# Patient Record
Sex: FEMALE | Race: BLACK OR AFRICAN AMERICAN | NOT HISPANIC OR LATINO | URBAN - METROPOLITAN AREA
[De-identification: names, ages, dates, MRNs, and addresses within clinical notes are randomized per-mention and may not be internally consistent; named-entity substitution may affect disease eponyms.]

---

## 2019-08-20 ENCOUNTER — INPATIENT (INPATIENT)
Facility: HOSPITAL | Age: 54
LOS: 6 days | Discharge: HOME | End: 2019-08-27
Attending: INTERNAL MEDICINE | Admitting: INTERNAL MEDICINE
Payer: MEDICAID

## 2019-08-20 VITALS
TEMPERATURE: 99 F | SYSTOLIC BLOOD PRESSURE: 134 MMHG | OXYGEN SATURATION: 100 % | RESPIRATION RATE: 16 BRPM | HEART RATE: 110 BPM | DIASTOLIC BLOOD PRESSURE: 89 MMHG

## 2019-08-20 LAB
ALBUMIN SERPL ELPH-MCNC: 3.8 G/DL — SIGNIFICANT CHANGE UP (ref 3.3–5)
ALP SERPL-CCNC: 72 U/L — SIGNIFICANT CHANGE UP (ref 40–120)
ALT FLD-CCNC: 12 U/L — SIGNIFICANT CHANGE UP (ref 4–33)
ANION GAP SERPL CALC-SCNC: 8 MMO/L — SIGNIFICANT CHANGE UP (ref 7–14)
APPEARANCE UR: CLEAR — SIGNIFICANT CHANGE UP
AST SERPL-CCNC: 28 U/L — SIGNIFICANT CHANGE UP (ref 4–32)
BASE EXCESS BLDV CALC-SCNC: 6.6 MMOL/L — SIGNIFICANT CHANGE UP
BASOPHILS # BLD AUTO: 0.01 K/UL — SIGNIFICANT CHANGE UP (ref 0–0.2)
BASOPHILS NFR BLD AUTO: 0.1 % — SIGNIFICANT CHANGE UP (ref 0–2)
BILIRUB SERPL-MCNC: 0.5 MG/DL — SIGNIFICANT CHANGE UP (ref 0.2–1.2)
BILIRUB UR-MCNC: NEGATIVE — SIGNIFICANT CHANGE UP
BLOOD GAS VENOUS - CREATININE: 0.57 MG/DL — SIGNIFICANT CHANGE UP (ref 0.5–1.3)
BLOOD UR QL VISUAL: NEGATIVE — SIGNIFICANT CHANGE UP
BUN SERPL-MCNC: 7 MG/DL — SIGNIFICANT CHANGE UP (ref 7–23)
CALCIUM SERPL-MCNC: 9.2 MG/DL — SIGNIFICANT CHANGE UP (ref 8.4–10.5)
CHLORIDE BLDV-SCNC: 103 MMOL/L — SIGNIFICANT CHANGE UP (ref 96–108)
CHLORIDE SERPL-SCNC: 101 MMOL/L — SIGNIFICANT CHANGE UP (ref 98–107)
CO2 SERPL-SCNC: 29 MMOL/L — SIGNIFICANT CHANGE UP (ref 22–31)
COLOR SPEC: SIGNIFICANT CHANGE UP
CREAT SERPL-MCNC: 0.56 MG/DL — SIGNIFICANT CHANGE UP (ref 0.5–1.3)
EOSINOPHIL # BLD AUTO: 0.01 K/UL — SIGNIFICANT CHANGE UP (ref 0–0.5)
EOSINOPHIL NFR BLD AUTO: 0.1 % — SIGNIFICANT CHANGE UP (ref 0–6)
GAS PNL BLDV: 138 MMOL/L — SIGNIFICANT CHANGE UP (ref 136–146)
GLUCOSE BLDV-MCNC: 101 MG/DL — HIGH (ref 70–99)
GLUCOSE SERPL-MCNC: 100 MG/DL — HIGH (ref 70–99)
GLUCOSE UR-MCNC: NEGATIVE — SIGNIFICANT CHANGE UP
HCO3 BLDV-SCNC: 30 MMOL/L — HIGH (ref 20–27)
HCT VFR BLD CALC: 30.8 % — LOW (ref 34.5–45)
HCT VFR BLDV CALC: 29.3 % — LOW (ref 34.5–45)
HETEROPH AB TITR SER AGGL: NEGATIVE — SIGNIFICANT CHANGE UP
HGB BLD-MCNC: 8.9 G/DL — LOW (ref 11.5–15.5)
HGB BLDV-MCNC: 9.5 G/DL — LOW (ref 11.5–15.5)
IMM GRANULOCYTES NFR BLD AUTO: 0.4 % — SIGNIFICANT CHANGE UP (ref 0–1.5)
KETONES UR-MCNC: NEGATIVE — SIGNIFICANT CHANGE UP
LACTATE BLDV-MCNC: 1 MMOL/L — SIGNIFICANT CHANGE UP (ref 0.5–2)
LEUKOCYTE ESTERASE UR-ACNC: NEGATIVE — SIGNIFICANT CHANGE UP
LYMPHOCYTES # BLD AUTO: 1.55 K/UL — SIGNIFICANT CHANGE UP (ref 1–3.3)
LYMPHOCYTES # BLD AUTO: 18.9 % — SIGNIFICANT CHANGE UP (ref 13–44)
MCHC RBC-ENTMCNC: 22.7 PG — LOW (ref 27–34)
MCHC RBC-ENTMCNC: 28.9 % — LOW (ref 32–36)
MCV RBC AUTO: 78.6 FL — LOW (ref 80–100)
MONOCYTES # BLD AUTO: 0.41 K/UL — SIGNIFICANT CHANGE UP (ref 0–0.9)
MONOCYTES NFR BLD AUTO: 5 % — SIGNIFICANT CHANGE UP (ref 2–14)
NEUTROPHILS # BLD AUTO: 6.17 K/UL — SIGNIFICANT CHANGE UP (ref 1.8–7.4)
NEUTROPHILS NFR BLD AUTO: 75.5 % — SIGNIFICANT CHANGE UP (ref 43–77)
NITRITE UR-MCNC: NEGATIVE — SIGNIFICANT CHANGE UP
NRBC # FLD: 0 K/UL — SIGNIFICANT CHANGE UP (ref 0–0)
PCO2 BLDV: 48 MMHG — SIGNIFICANT CHANGE UP (ref 41–51)
PH BLDV: 7.42 PH — SIGNIFICANT CHANGE UP (ref 7.32–7.43)
PH UR: 8 — SIGNIFICANT CHANGE UP (ref 5–8)
PLATELET # BLD AUTO: 165 K/UL — SIGNIFICANT CHANGE UP (ref 150–400)
PMV BLD: 11.5 FL — SIGNIFICANT CHANGE UP (ref 7–13)
PO2 BLDV: 77 MMHG — HIGH (ref 35–40)
POTASSIUM BLDV-SCNC: 3.9 MMOL/L — SIGNIFICANT CHANGE UP (ref 3.4–4.5)
POTASSIUM SERPL-MCNC: 4.1 MMOL/L — SIGNIFICANT CHANGE UP (ref 3.5–5.3)
POTASSIUM SERPL-SCNC: 4.1 MMOL/L — SIGNIFICANT CHANGE UP (ref 3.5–5.3)
PROT SERPL-MCNC: 9.1 G/DL — HIGH (ref 6–8.3)
PROT UR-MCNC: 10 — SIGNIFICANT CHANGE UP
RBC # BLD: 3.92 M/UL — SIGNIFICANT CHANGE UP (ref 3.8–5.2)
RBC # FLD: 14.6 % — HIGH (ref 10.3–14.5)
SAO2 % BLDV: 95.7 % — HIGH (ref 60–85)
SODIUM SERPL-SCNC: 138 MMOL/L — SIGNIFICANT CHANGE UP (ref 135–145)
SP GR SPEC: 1.01 — SIGNIFICANT CHANGE UP (ref 1–1.04)
T4 FREE SERPL-MCNC: 1.3 NG/DL — SIGNIFICANT CHANGE UP (ref 0.9–1.8)
TSH SERPL-MCNC: 1.11 UIU/ML — SIGNIFICANT CHANGE UP (ref 0.27–4.2)
UROBILINOGEN FLD QL: NORMAL — SIGNIFICANT CHANGE UP
WBC # BLD: 8.18 K/UL — SIGNIFICANT CHANGE UP (ref 3.8–10.5)
WBC # FLD AUTO: 8.18 K/UL — SIGNIFICANT CHANGE UP (ref 3.8–10.5)

## 2019-08-20 PROCEDURE — 71046 X-RAY EXAM CHEST 2 VIEWS: CPT | Mod: 26

## 2019-08-20 PROCEDURE — 70491 CT SOFT TISSUE NECK W/DYE: CPT | Mod: 26

## 2019-08-20 RX ORDER — SODIUM CHLORIDE 9 MG/ML
1000 INJECTION INTRAMUSCULAR; INTRAVENOUS; SUBCUTANEOUS ONCE
Refills: 0 | Status: COMPLETED | OUTPATIENT
Start: 2019-08-20 | End: 2019-08-20

## 2019-08-20 RX ORDER — KETOROLAC TROMETHAMINE 30 MG/ML
15 SYRINGE (ML) INJECTION ONCE
Refills: 0 | Status: DISCONTINUED | OUTPATIENT
Start: 2019-08-20 | End: 2019-08-20

## 2019-08-20 RX ADMIN — Medication 100 MILLIGRAM(S): at 20:50

## 2019-08-20 RX ADMIN — Medication 15 MILLIGRAM(S): at 19:10

## 2019-08-20 RX ADMIN — SODIUM CHLORIDE 2000 MILLILITER(S): 9 INJECTION INTRAMUSCULAR; INTRAVENOUS; SUBCUTANEOUS at 19:10

## 2019-08-20 NOTE — ED ADULT NURSE REASSESSMENT NOTE - NS ED NURSE REASSESS COMMENT FT1
pt received in RW at approx 20:15 from previous RN. Pt presents to ED c/o sore throat, palpitations and joint pain x 1 month. Pt had recent travel to [place]. Denies fevers/chills, N/V/D, CP, SOB. Pt throat appears slighty swollen, but airway patent, respirations even and unlabored, O2 sat on room air 100%, no drooling noted. Denies diff breathing or inability to swallow. Pt appears in no acute or apparent distress at this time. Pt arrives with IV line by EMS. Will continue to monitor.

## 2019-08-20 NOTE — ED PROVIDER NOTE - OBJECTIVE STATEMENT
54yo F w/ no significant pmhx presents to the ED c/o sore throat, palpitations and joint pains x 1 month. Pt is recently arrived from [place], states she was seen by PCP in [place] and was advised she needs to see an "auto-immune" doctor; however, pt decided to come to [place] for evaluation instead. Pt states she feels weak, today she felt like she may pass out while standing. Denies fevers, chills, cp, sob, abd pain, n/v/d, rashes.

## 2019-08-20 NOTE — ED PROVIDER NOTE - ATTENDING CONTRIBUTION TO CARE
Afebrile. Awake and Alert. Lungs CTA. Heart RRR. Abdomen soft NTND. CN II-XII grossly intact. Moves all extremities without lateralization. Mouth: Oropharynx clear, uvula midline, no tonsilar hypertrophy, exudate or erythema, no anterior cervical lymphadenopathy. No drooling. No hoarseness.    Pt reports 1 week sore throat. No drooling or dysphagia. No cough.  Pt reports also one week generalized weakness worse when standing up a/w fast heart beat. No CP or SOB.    CDU for IV Clindamycin for pharyngitis and MRI parotid gland given CT recs.  f/o medical clinic oupt. Afebrile. Awake and Alert. Lungs CTA. Heart RRR. Abdomen soft NTND. CN II-XII grossly intact. Moves all extremities without lateralization. Mouth: Oropharynx clear, uvula midline, no tonsilar hypertrophy, exudate or erythema, no anterior cervical lymphadenopathy. No drooling. No hoarseness.    Pt reports 1 week sore throat. No drooling or dysphagia. No cough.  Pt reports also one week generalized weakness worse when standing up a/w fast heart beat. No CP or SOB.    CDU for IV Clindamycin for pharyngitis and MRI parotid gland given CT recs.  Mononucleosis screen negative  EKG sinus tachycardia improved with IVF  Thyroid studies sent  f/o medical clinic oupt.

## 2019-08-20 NOTE — ED PROVIDER NOTE - PROGRESS NOTE DETAILS
Asked by MARIBEL to evaluate patient in intake who will be placed in CDU. See attending attestation. AL.

## 2019-08-20 NOTE — ED ADULT TRIAGE NOTE - CHIEF COMPLAINT QUOTE
Pt [facility] just arrived here from [place], c/o palpitations and dizziness c/o joint pain x several months no respiratory distress noted appears comfortable   PMH: denies

## 2019-08-20 NOTE — ED ADULT TRIAGE NOTE - NS ED NOTE AC HIGH RISK COUNTRIES
Other Countries no loss of consciousness, no gait abnormality, no headache, no sensory deficits, and no weakness.

## 2019-08-21 DIAGNOSIS — D64.9 ANEMIA, UNSPECIFIED: ICD-10-CM

## 2019-08-21 DIAGNOSIS — J02.9 ACUTE PHARYNGITIS, UNSPECIFIED: ICD-10-CM

## 2019-08-21 DIAGNOSIS — Z29.9 ENCOUNTER FOR PROPHYLACTIC MEASURES, UNSPECIFIED: ICD-10-CM

## 2019-08-21 DIAGNOSIS — M25.50 PAIN IN UNSPECIFIED JOINT: ICD-10-CM

## 2019-08-21 DIAGNOSIS — K11.20 SIALOADENITIS, UNSPECIFIED: ICD-10-CM

## 2019-08-21 LAB
C3 SERPL-MCNC: 60.1 MG/DL — LOW (ref 90–180)
C4 SERPL-MCNC: 20.2 MG/DL — SIGNIFICANT CHANGE UP (ref 10–40)
HBA1C BLD-MCNC: 4.9 % — SIGNIFICANT CHANGE UP (ref 4–5.6)
SPECIMEN SOURCE: SIGNIFICANT CHANGE UP
SPECIMEN SOURCE: SIGNIFICANT CHANGE UP

## 2019-08-21 PROCEDURE — 99223 1ST HOSP IP/OBS HIGH 75: CPT

## 2019-08-21 PROCEDURE — 70542 MRI ORBIT/FACE/NECK W/DYE: CPT | Mod: 26

## 2019-08-21 RX ORDER — DEXAMETHASONE 0.5 MG/5ML
4 ELIXIR ORAL ONCE
Refills: 0 | Status: COMPLETED | OUTPATIENT
Start: 2019-08-21 | End: 2019-08-21

## 2019-08-21 RX ORDER — DEXAMETHASONE 0.5 MG/5ML
10 ELIXIR ORAL ONCE
Refills: 0 | Status: COMPLETED | OUTPATIENT
Start: 2019-08-21 | End: 2019-08-21

## 2019-08-21 RX ORDER — SODIUM CHLORIDE 9 MG/ML
1000 INJECTION INTRAMUSCULAR; INTRAVENOUS; SUBCUTANEOUS
Refills: 0 | Status: DISCONTINUED | OUTPATIENT
Start: 2019-08-21 | End: 2019-08-22

## 2019-08-21 RX ORDER — SODIUM CHLORIDE 9 MG/ML
1000 INJECTION INTRAMUSCULAR; INTRAVENOUS; SUBCUTANEOUS ONCE
Refills: 0 | Status: COMPLETED | OUTPATIENT
Start: 2019-08-21 | End: 2019-08-21

## 2019-08-21 RX ORDER — KETOROLAC TROMETHAMINE 30 MG/ML
30 SYRINGE (ML) INJECTION EVERY 6 HOURS
Refills: 0 | Status: DISCONTINUED | OUTPATIENT
Start: 2019-08-21 | End: 2019-08-22

## 2019-08-21 RX ADMIN — Medication 102 MILLIGRAM(S): at 03:19

## 2019-08-21 RX ADMIN — Medication 100 MILLIGRAM(S): at 06:50

## 2019-08-21 RX ADMIN — Medication 4 MILLIGRAM(S): at 01:59

## 2019-08-21 RX ADMIN — SODIUM CHLORIDE 125 MILLILITER(S): 9 INJECTION INTRAMUSCULAR; INTRAVENOUS; SUBCUTANEOUS at 06:54

## 2019-08-21 RX ADMIN — Medication 100 MILLIGRAM(S): at 22:53

## 2019-08-21 RX ADMIN — Medication 100 MILLIGRAM(S): at 14:14

## 2019-08-21 RX ADMIN — SODIUM CHLORIDE 1000 MILLILITER(S): 9 INJECTION INTRAMUSCULAR; INTRAVENOUS; SUBCUTANEOUS at 03:20

## 2019-08-21 NOTE — H&P ADULT - NSHPPHYSICALEXAM_GEN_ALL_CORE
PHYSICAL EXAM:  GENERAL: NAD, well-developed  HEAD:  Atraumatic, Normocephalic  EYES: EOMI, PERRLA, conjunctiva and sclera clear  NECK: Supple, No JVD  CHEST/LUNG: Clear to auscultation bilaterally; No wheeze  HEART: NL s1s2, regular rate and rhythm; No murmurs, rubs, or gallops  ABDOMEN: Soft, Nontender, Nondistended; Bowel sounds present  EXTREMITIES:  2+ Peripheral Pulses, No clubbing, cyanosis, or edema  PSYCH: AAOx3  NEUROLOGY: non-focal  SKIN: No rashes or lesions PHYSICAL EXAM:  GENERAL: NAD, well-developed  HEAD:  Atraumatic, Normocephalic  MOUTH: Parotids nontender. Poor dentition.   EYES: EOMI, PERRLA, conjunctiva and sclera clear  NECK: Supple, No JVD  CHEST/LUNG: Clear to auscultation bilaterally; No wheeze  HEART: NL s1s2, regular rate and rhythm; No murmurs, rubs, or gallops  ABDOMEN: Soft, Nontender, Nondistended; Bowel sounds present  EXTREMITIES:  2+ Peripheral Pulses, No clubbing, cyanosis, or edema  PSYCH: AAOx3  NEUROLOGY: non-focal  SKIN: No rashes or lesions PHYSICAL EXAM:  GENERAL: NAD, well-developed  HEAD: Atraumatic, Normocephalic  MOUTH: Parotids nontender. Poor dentition.   EYES: EOMI, PERRL, conjunctiva and sclera clear  NECK: Supple, No JVD, No LAD  CHEST/LUNG: Good inspiratory effort; Clear to auscultation bilaterally; No wheeze  HEART: NL s1s2, regular rate and rhythm; No murmurs, rubs, or gallops; No LE edema b/l  ABDOMEN: Soft, Nontender, Nondistended; Bowel sounds present  EXTREMITIES:  2+ Peripheral Pulses, No cyanosis  NEUROLOGY: AA&Ox3; non-focal  SKIN: Warm and dry; No rashes or lesions

## 2019-08-21 NOTE — H&P ADULT - NSHPREVIEWOFSYSTEMS_GEN_ALL_CORE
REVIEW OF SYSTEMS:    CONSTITUTIONAL: No weakness, fevers or chills  EYES/ENT: No visual changes;  No vertigo or throat pain   NECK: No pain or stiffness  RESPIRATORY: No cough, wheezing, hemoptysis; No shortness of breath  CARDIOVASCULAR: No chest pain or palpitations  GASTROINTESTINAL: No abdominal or epigastric pain. No nausea, vomiting, or hematemesis; No diarrhea or constipation. No melena or hematochezia.  GENITOURINARY: No dysuria, frequency or hematuria  NEUROLOGICAL: No numbness or weakness  SKIN: No itching, burning, rashes, or lesions   All other review of systems is negative unless indicated above. REVIEW OF SYSTEMS:    CONSTITUTIONAL: +fatigue especially with exertion  EYES/ENT: No visual changes;  sore throat  NECK: No pain or stiffness  RESPIRATORY: No cough, wheezing, hemoptysis; No shortness of breath  CARDIOVASCULAR: No chest pain or palpitations  GASTROINTESTINAL: No abdominal or epigastric pain. No nausea, vomiting, or hematemesis; No diarrhea or constipation. No melena or hematochezia.  GENITOURINARY: No dysuria, frequency or hematuria  NEUROLOGICAL: No numbness or weakness  SKIN: No itching, burning, rashes, or lesions   All other review of systems is negative unless indicated above. REVIEW OF SYSTEMS:    CONSTITUTIONAL: +Fatigue especially with exertion. No fever or chills.  EYES: +Dry eyes. No eye pain, blurry vision, or double vision.  MOUTH: +Dry mouth. Poor dentition with cavities.   NECK: No pain or stiffness  RESPIRATORY: No cough, wheezing, hemoptysis; No shortness of breath  CARDIOVASCULAR: No chest pain or palpitations  GASTROINTESTINAL: No abdominal or epigastric pain. No nausea, vomiting, or hematemesis; No diarrhea or constipation. No melena or hematochezia.  GENITOURINARY: +Vaginal dryness. +Dyspareunia. No dysuria, frequency or hematuria.  MUSCULOSKELETAL: +Diffuse arthralgias. No back pain.  NEUROLOGICAL: No headaches, seizures, numbness/tingling, or weakness  SKIN: No itching, burning, rashes, or lesions   All other review of systems is negative unless indicated above.

## 2019-08-21 NOTE — ED CDU PROVIDER INITIAL DAY NOTE - PROGRESS NOTE DETAILS
patient assessed on morning rounds. TOlerating po, endorsing feeling better-still with some generalized fatigue. Ambulating in cdu without difficulty. Exam A & O x 3, NAD, HEENT -no tonsillar swelling/erythema or exudate, uvula midline, +nontender left submandibular 1cm firm mass, no overlying skin changes,  and no facial asymmetry; neck supple, nontender, lungs CTAB, heart with reg rhythm without murmur; abdomen soft NTND; extremities with no edema; neuro exam non focal with no motor or sensory deficits. ct findings noted. SEnt to cdu for mri to f/u ct findings. Plan today for supportive care, mri, reas.

## 2019-08-21 NOTE — ED CDU PROVIDER INITIAL DAY NOTE - ENMT, MLM
Airway patent. Nasal mucosa clear. Mouth with moist mucosa. Pharynx hyperemic, uvula is midline.  Neck supple.

## 2019-08-21 NOTE — H&P ADULT - PROBLEM SELECTOR PLAN 1
-Rheum consult  -SSA/SSB abs  -nora  -RF  -IGs  -ESR  -r/o HTLV-1, HCV, HIV  -Optho consult for schirmer test -Rheum consult  -SSA/SSB abs  -nora  -RF  -IGs  -ESR  -r/o HTLV-1, HCV, HIV  -Optho consult for schirmer test  - consult for insurance Given clinical picture, concern for autoimmune sialoadenitis / Sjogren's, lower suspicion for bacterial.   -Rheum consult in AM (though unclear about follow-up situation upon discharge from hospital, as pt with plans to return to [place] later this year)  -SSA/SSB abs  -Check TODD, ESR, RF, immunoglobulins  -R/o HTLV-1, HCV, HIV  -Ophtho consult for Schirmer test  - consult for insurance

## 2019-08-21 NOTE — H&P ADULT - PROBLEM SELECTOR PLAN 2
Low suspicion for infectious eitiology, however, patient is not vaccinated for mumps  - Anemia likely 2/2 anemia of chronic disease. Patient denies melena, however, has never had a colonoscopy and is mildly microcytic  -Iron studies Anemia likely 2/2 anemia of chronic disease. Patient denies melena, however, has never had a colonoscopy and is mildly microcytic (MCV 78.6)  -Iron studies  -FOBT Anemia 2/2 blood loss anemia or anemia of chronic disease. Patient denies melena, however, has never had a colonoscopy and is mildly microcytic (MCV 78.6) and palpitations provoked changing position concerning for orthostasis suggesting blood loss.  -Iron studies  -FOBT Anemia 2/2 blood loss anemia or anemia of chronic disease. Patient denies melena, however, has never had a colonoscopy and is mildly microcytic (MCV 78.6) and palpitations provoked changing position concerning for orthostasis suggesting blood loss.  -Check iron studies and FOBT

## 2019-08-21 NOTE — H&P ADULT - PROBLEM SELECTOR PLAN 4
DVT PPx: IMPROVE score 0, no ppx  Diet: regular  Dispo: pending workup Low suspicion for infectious etiology. Afebrile, nontender to exam. Patient is vaccinated for mumps. Sore throat likely 2/2 inflammatory disease of Sjogren's.  - Will discontinue Clindamycin and monitor clinically for now  - CT also showing complete opacification of R maxillary sinus. However, pt without any maxillary tenderness on exam.

## 2019-08-21 NOTE — ED CDU PROVIDER INITIAL DAY NOTE - ATTENDING CONTRIBUTION TO CARE
Patient seen in intake by MARIBEL. Attending asked to evaluate case at 2AM 8/21/19 given plan to place patient in CDU.      Afebrile. Awake and Alert. Lungs CTA. Heart RRR. Abdomen soft NTND. CN II-XII grossly intact. Moves all extremities without lateralization. Mouth: Oropharynx clear, uvula midline, no tonsilar hypertrophy, exudate or erythema, no anterior cervical lymphadenopathy. No drooling. No hoarseness.    Pt reports 1 week sore throat. No drooling or dysphagia. No cough.  Pt reports also one week generalized weakness worse when standing up a/w fast heart beat. No CP or SOB.    CDU for IV Clindamycin for pharyngitis and MRI parotid gland given CT recs.  Mononucleosis screen negative  EKG sinus tachycardia improved with IVF  Thyroid studies sent  f/o medical clinic oupt. Pt is visiting from the country [place] and does not have local follow-up.

## 2019-08-21 NOTE — ED CDU PROVIDER INITIAL DAY NOTE - MEDICAL DECISION MAKING DETAILS
IV antibiotic (clindamycin) per orders, IV hydration, supportive care, MRI per orders, general observation care / monitoring.

## 2019-08-21 NOTE — H&P ADULT - NSHPLABSRESULTS_GEN_ALL_CORE
8.9    8.18  )-----------( 165      ( 20 Aug 2019 19:00 )             30.8           138  |  101  |  7   ----------------------------<  100<H>  4.1   |  29  |  0.56    Ca    9.2      20 Aug 2019 19:00    TPro  9.1<H>  /  Alb  3.8  /  TBili  0.5  /  DBili  x   /  AST  28  /  ALT  12  /  AlkPhos  72                Urinalysis Basic - ( 20 Aug 2019 19:00 )    Color: LIGHT YELLOW / Appearance: CLEAR / S.012 / pH: 8.0  Gluc: NEGATIVE / Ketone: NEGATIVE  / Bili: NEGATIVE / Urobili: NORMAL   Blood: NEGATIVE / Protein: 10 / Nitrite: NEGATIVE   Leuk Esterase: NEGATIVE / RBC: x / WBC x   Sq Epi: x / Non Sq Epi: x / Bacteria: x    C3 Complement, Serum: 60.1 mg/dL (19 @ 18:18)  C4 Complement, Serum: 20.2 mg/dL (19 @ 18:18)    Infectious Mononucleosis Screen: NEGATIVE (19 @ 19:00)    < from: CT Neck Soft Tissue w/ IV Cont (19 @ 23:10) >    IMPRESSION:    Mild prominence of the the left pharyngeal/parapharyngeal soft tissues,   which may be reflectiveof pharyngitis. Recommend clinical correlation.   No abscess identified.     Multiple bilateral prominent axillary and cervical chain lymph nodes.     Heterogeneous appearance of prominent left parotid gland containing   scattered stippled calcifications without surrounding edema. Correlate   with prior studies and consider nonemergent follow-up with MR for better   characterization.    Complete opacification of the right maxillary sinus.    < end of copied text >    < from: MR Neck Soft Tissue Only w/ IV Cont (19 @ 10:58) >    IMPRESSION:  Heterogeneous appearing parotid glands with prominent   enhancement as well as calcifications seen on prior CT scan. Atrophic   fatty replaced submandibular glands. Autoimmune sialoadenitis/Sjogren's   syndrome is a prime consideration    Multiple prominent sized cervical lymph nodes bilaterally.    < end of copied text > 8.9    8.18  )-----------( 165      ( 20 Aug 2019 19:00 )             30.8           138  |  101  |  7   ----------------------------<  100<H>  4.1   |  29  |  0.56    Ca    9.2      20 Aug 2019 19:00    TPro  9.1<H>  /  Alb  3.8  /  TBili  0.5  /  DBili  x   /  AST  28  /  ALT  12  /  AlkPhos  72          Urinalysis Basic - ( 20 Aug 2019 19:00 )    Color: LIGHT YELLOW / Appearance: CLEAR / S.012 / pH: 8.0  Gluc: NEGATIVE / Ketone: NEGATIVE  / Bili: NEGATIVE / Urobili: NORMAL   Blood: NEGATIVE / Protein: 10 / Nitrite: NEGATIVE   Leuk Esterase: NEGATIVE / RBC: x / WBC x   Sq Epi: x / Non Sq Epi: x / Bacteria: x    C3 Complement, Serum: 60.1 mg/dL (19 @ 18:18)  C4 Complement, Serum: 20.2 mg/dL (19 @ 18:18)    Infectious Mononucleosis Screen: NEGATIVE (19 @ 19:00)    Imaging personally reviewed.  < from: CT Neck Soft Tissue w/ IV Cont (19 @ 23:10) >    IMPRESSION:    Mild prominence of the the left pharyngeal/parapharyngeal soft tissues,   which may be reflectiveof pharyngitis. Recommend clinical correlation.   No abscess identified.     Multiple bilateral prominent axillary and cervical chain lymph nodes.     Heterogeneous appearance of prominent left parotid gland containing   scattered stippled calcifications without surrounding edema. Correlate   with prior studies and consider nonemergent follow-up with MR for better   characterization.    Complete opacification of the right maxillary sinus.    < end of copied text >    < from: MR Neck Soft Tissue Only w/ IV Cont (19 @ 10:58) >    IMPRESSION:  Heterogeneous appearing parotid glands with prominent   enhancement as well as calcifications seen on prior CT scan. Atrophic   fatty replaced submandibular glands. Autoimmune sialoadenitis/Sjogren's   syndrome is a prime consideration    Multiple prominent sized cervical lymph nodes bilaterally.    < end of copied text > 8.9    8.18  )-----------( 165      ( 20 Aug 2019 19:00 )             30.8           138  |  101  |  7   ----------------------------<  100<H>  4.1   |  29  |  0.56    Ca    9.2      20 Aug 2019 19:00    TPro  9.1<H>  /  Alb  3.8  /  TBili  0.5  /  DBili  x   /  AST  28  /  ALT  12  /  AlkPhos  72          Urinalysis Basic - ( 20 Aug 2019 19:00 )    Color: LIGHT YELLOW / Appearance: CLEAR / S.012 / pH: 8.0  Gluc: NEGATIVE / Ketone: NEGATIVE  / Bili: NEGATIVE / Urobili: NORMAL   Blood: NEGATIVE / Protein: 10 / Nitrite: NEGATIVE   Leuk Esterase: NEGATIVE / RBC: x / WBC x   Sq Epi: x / Non Sq Epi: x / Bacteria: x    C3 Complement, Serum: 60.1 mg/dL (19 @ 18:18)  C4 Complement, Serum: 20.2 mg/dL (19 @ 18:18)    Infectious Mononucleosis Screen: NEGATIVE (19 @ 19:00)    Imaging personally reviewed.  < from: CT Neck Soft Tissue w/ IV Cont (19 @ 23:10) >    IMPRESSION:    Mild prominence of the the left pharyngeal/parapharyngeal soft tissues,   which may be reflective of pharyngitis. Recommend clinical correlation.   No abscess identified.     Multiple bilateral prominent axillary and cervical chain lymph nodes.     Heterogeneous appearance of prominent left parotid gland containing   scattered stippled calcifications without surrounding edema. Correlate   with prior studies and consider nonemergent follow-up with MR for better   characterization.    Complete opacification of the right maxillary sinus.    < end of copied text >    < from: MR Neck Soft Tissue Only w/ IV Cont (19 @ 10:58) >    IMPRESSION:  Heterogeneous appearing parotid glands with prominent   enhancement as well as calcifications seen on prior CT scan. Atrophic   fatty replaced submandibular glands. Autoimmune sialoadenitis/Sjogren's   syndrome is a prime consideration    Multiple prominent sized cervical lymph nodes bilaterally.    < end of copied text >

## 2019-08-21 NOTE — H&P ADULT - NSHPSOCIALHISTORY_GEN_ALL_CORE
Lives in [place]. Came her for medical care. Lives in [place]. Visiting to help care for her sister's children. Never smoker, minimal alcohol use at parties, no recreational drugs.

## 2019-08-21 NOTE — ED CDU PROVIDER DISPOSITION NOTE - ATTENDING CONTRIBUTION TO CARE
Patient presented to ed with multiple complaints including joint pains, throat pain, fatigue, palps. Patient had labs and ct in ed with abnl findings which per radiology rec would be better evaluated by mri. Patient was sent to cdu for abx for pharyngitis and mri. MRi findings indicative of possible sjogrens and given constellation of symptoms, concern for possible autoimmune d/o. Rheum was consulted, recommended a panel of labs to further investigate and admission. plan d/w hospitalist and patient-agreeable with plan for admission for further management. vss and nontoxic appearing at time of admission. pain controlled.

## 2019-08-21 NOTE — ED CDU PROVIDER DISPOSITION NOTE - CLINICAL COURSE
Pt is 52 y/o female with no significant PMhx here for eval of palpitations and joint pain x 1 month, worse for the past 4-5days with new onset sore throat. Pt was had CT of the neck which didn't show any abscess and MRI was recommended which showed changes in parotid glands concerning for Sjorgen's syndrome; rheumatology was consulted and advised admission as pt needs multiple specialty tests; will admit ot medicine;

## 2019-08-21 NOTE — H&P ADULT - PROBLEM SELECTOR PLAN 3
DVT PPx: IMPROVE score 0, no ppx  Diet: regular  Dispo: pending workup Low suspicion for infectious etiology. Afebrile, nontender to exam. Patient is vaccinated for mumps. Likely pain 2/2 inflammatory disease of SS. Low suspicion for infectious etiology. Afebrile, nontender to exam. Patient is vaccinated for mumps. Sore throat 2/2 inflammatory disease of SS. Low suspicion for infectious etiology. Afebrile, nontender to exam. Patient is vaccinated for mumps. Sore throat likely 2/2 inflammatory disease of Sjogren's.  - Will discontinue Clindamycin and monitor clinically for now  - CT also showing complete opacification Low suspicion for infectious etiology. Afebrile, nontender to exam. Patient is vaccinated for mumps. Sore throat likely 2/2 inflammatory disease of Sjogren's.  - Will discontinue Clindamycin and monitor clinically for now  - CT also showing complete opacification of R maxillary sinus. However, pt without any maxillary tenderness on exam. CT neck revealing prominent empty sella, concerning for endocrine dysfunction.  - Check TSH, T4/free T3, AM cortisol, prolactin, FSH, LH, and IGF-1   - Endocrine consult pending results of hormone studies

## 2019-08-21 NOTE — H&P ADULT - ASSESSMENT
54 yo F who denies any significant PMHx p/w sore throat, diffuse arthralgias, and orthostatic palpitations x 1 month found to have sialoadenitis with parotid inflammation and submandibular gland atrophy on CT & MR concerning for sjogren's syndrome. 52 yo F who denies any significant PMHx p/w dry mouth, dry vagina, dyspareunia x4 months followed by dry eyes, fatigue on exertion, sore throat, diffuse arthralgias, and orthostatic palpitations x 1 month concerning for Sjogren's syndrome found to have anemia. 52 yo F who denies any significant PMHx p/w dry mouth, dry vagina, dyspareunia x4 months followed by dry eyes, fatigue on exertion, sore throat, diffuse arthralgias, and orthostatic palpitations x 1 month concerning for Sjogren's syndrome found to have anemia in setting of palpitations and fatigue concerning for blood loss anemia vs. anemia of chronic disease.

## 2019-08-21 NOTE — H&P ADULT - HISTORY OF PRESENT ILLNESS
52 yo F who denies any significant PMHx p/w sore throat, diffuse arthralgias, and orthostatic palpitations x 1 month. She also notes generalized weakness. She only gets the palpitations when she stands suddenly.     Of note, the patient is from [facility], saw her PCP there, and suggested that she needs to see a rheumatologist. [place] then [place]. Denies cp, sob, abdominal pain. Denies fevers, sweats, and chills. No N/V or diarrhea.     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 MR showing heterogenous parotid glands, atrophic submandibular glands, cervical LAD c/w sialoadenitis concerning for Sjogern's syndrome. 52 yo F who denies any significant PMHx p/w sore throat, diffuse arthralgias, and orthostatic palpitations x 1 month. She also notes generalized weakness. She only gets the palpitations when she stands suddenly.     Of note, the patient is from [facility], saw her PCP there, and suggested that she needs to see a rheumatologist. [place] then [place]. Denies cp, sob, abdominal pain. Denies fevers, sweats, and chills. No N/V or diarrhea.     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 MR showing heterogenous parotid glands, atrophic submandibular glands, cervical LAD c/w sialoadenitis concerning for Sjogern's syndrome.   s/p 600mg clinda IV, 4mg dexameth IV x1, 10mg dexameth x1, 15mg ketorolac IV x1, 1L NS, on NS @ 125cc/hr. 54 yo F who denies any significant PMHx p/w dry mouth, dry vagina, dyspareunia x4 months followed by dry eyes, fatigue on exertion, sore throat, diffuse arthralgias, and orthostatic palpitations x 1 month. She only gets the palpitations when she stands suddenly or exerts herself too much. She decided to come to the hospital today b/c she had an episode of palpitations a/w anxiety attack. Has had 1 anxiety attack per week x3 weeks. Also notes episodes of pieces of her teeth cracking off.      Of note, the patient is here visiting from [facility] to help take care of her sister's children. Before she came here, she saw her PCP there, and suggested that she needs to see a rheumatologist. Denies cp, sob, abdominal pain. Denies fevers, sweats, and chills. No N/V or diarrhea. Denies melena and hematemesis. No FHx of colon cancer.    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 MR showing heterogenous parotid glands, atrophic submandibular glands, cervical LAD c/w sialoadenitis concerning for Sjogern's syndrome.   s/p 600mg clinda IV, 4mg dexameth IV x1, 10mg dexameth x1, 15mg ketorolac IV x1, 1L NS, on NS @ 125cc/hr. 54 yo F who denies any significant PMHx p/w dry mouth, dry vagina, dyspareunia x4 months followed by dry eyes, fatigue on exertion, sore throat, diffuse arthralgias, and orthostatic palpitations x 1 month. She only gets the palpitations when she stands suddenly or exerts herself too much. She decided to come to the hospital today b/c she had an episode of palpitations a/w anxiety attack. Has had 1 anxiety attack per week x3 weeks. Also notes episodes of pieces of her teeth cracking off.      Of note, the patient is here visiting from [place] to help take care of her sister's children. Before she came here, she saw her PCP there, and suggested that she needs to see a rheumatologist. Denies cp, sob, abdominal pain. Denies fevers, sweats, and chills. No N/V or diarrhea. Denies melena and hematemesis. No FHx of colon cancer. Never had a colonoscopy. Completed menopause in 2015.     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 MR showing heterogenous parotid glands, atrophic submandibular glands, cervical LAD c/w sialoadenitis concerning for Sjogern's syndrome.   s/p 600mg clinda IV, 4mg dexameth IV x1, 10mg dexameth x1, 15mg ketorolac IV x1, 1L NS, on NS @ 125cc/hr. 54 yo F who denies any significant PMHx p/w dry mouth, dry vagina, dyspareunia x4 months followed by dry eyes, fatigue on exertion, sore throat, diffuse arthralgias, and orthostatic palpitations x 1 month. She only gets the palpitations when she stands suddenly or exerts herself too much. She decided to come to the hospital today b/c she had an episode of palpitations a/w anxiety attack. Has had 1 anxiety attack per week x3 weeks. Also notes episodes of pieces of her teeth cracking off.     Of note, the patient is here visiting from [place] to help take care of her sister's children. Before she came here, she saw her PCP there, and was recommended to see a rheumatologist. Denies cp, sob, abdominal pain. Denies fevers, sweats, and chills. No N/V or diarrhea. Denies melena and hematemesis. No FHx of colon cancer. Never had a colonoscopy. Completed menopause in 2015.     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 MR showing heterogenous parotid glands, atrophic submandibular glands, cervical LAD c/w sialoadenitis concerning for Sjogern's syndrome.   s/p 600mg clinda IV, 4mg dexameth IV x1, 10mg dexameth x1, 15mg ketorolac IV x1, 1L NS, on NS @ 125cc/hr.

## 2019-08-21 NOTE — ED CDU PROVIDER INITIAL DAY NOTE - INDICATION FOR OBSERVATION
IV antibiotic (clindamycin) per orders, IV hydration, supportive care, MRI per orders, general observation care / monitoring./Diagnostic Uncertainty/Therapeutic Intensity/Other

## 2019-08-21 NOTE — ED CDU PROVIDER INITIAL DAY NOTE - OBJECTIVE STATEMENT
54yo F w/ no significant pmhx presents to the ED c/o sore throat, palpitations and joint pains x 1 month. Pt is recently arrived from [place], states she was seen by PCP in [place] and was advised she needs to see an "auto-immune" doctor; however, [place] decided to come to [place] instead. Pt states she feels weak, today she felt like she may pass out while standing. Denies fevers, chills, cp, sob, abd pain, n/v/d, rashes.    CDU CHANDRAKANT Jackson Note---  52 yo female, no stated PMH, presented to the ED for 1 month hx/o sore throat, intermittent palpitations, arthralgias, as per above.  Pt. was evaluated in the ED, dx with pharyngitis on exam, started on clindamycin IV.  Labs: WBC 8.18, Hb/Hct 8.9/30.8, CMP grossly unremarkable, TSH 1.11, free T4 1.30, lactate 1.0, UA negative, Mono negative; CT soft tissue neck showed "IMPRESSION:  Mild prominence of the the left pharyngeal/parapharyngeal soft tissues, which may be reflective of pharyngitis. Recommend clinical correlation.  No abscess identified.  Multiple bilateral prominent axillary and cervical chain lymph nodes.  Heterogeneous appearance of prominent left parotid gland containing scattered stippled calcifications without surrounding edema.  Correlate with prior studies and consider nonemergent follow-up with MR for better characterization.  Complete opacification of the right maxillary sinus.".  Pt. was dispo'd to CDU for continued care plan:  IV antibiotic (clindamycin) per orders, IV hydration, supportive care, MRI per orders, general observation care / monitoring.

## 2019-08-22 DIAGNOSIS — E23.6 OTHER DISORDERS OF PITUITARY GLAND: ICD-10-CM

## 2019-08-22 LAB
CERULOPLASMIN SERPL-MCNC: 22 MG/DL — SIGNIFICANT CHANGE UP (ref 16–45)
CERULOPLASMIN SERPL-MCNC: 23 MG/DL — SIGNIFICANT CHANGE UP (ref 16–45)
CK MB BLD-MCNC: 2.93 NG/ML — SIGNIFICANT CHANGE UP (ref 1–4.7)
CK MB BLD-MCNC: SIGNIFICANT CHANGE UP (ref 0–2.5)
CK SERPL-CCNC: 63 U/L — SIGNIFICANT CHANGE UP (ref 25–170)
DSDNA AB FLD-ACNC: <0.2 — SIGNIFICANT CHANGE UP
DSDNA AB SER-ACNC: <12 IU/ML — SIGNIFICANT CHANGE UP
ENA SS-A AB FLD IA-ACNC: >8 — HIGH
ERYTHROCYTE [SEDIMENTATION RATE] IN BLOOD: 66 MM/HR — HIGH (ref 4–25)
FERRITIN SERPL-MCNC: 279.2 NG/ML — HIGH (ref 15–150)
FOLATE SERPL-MCNC: 13.2 NG/ML — SIGNIFICANT CHANGE UP (ref 4.7–20)
FOLATE SERPL-MCNC: 15.3 NG/ML — SIGNIFICANT CHANGE UP (ref 4.7–20)
HAV IGM SER-ACNC: NONREACTIVE — SIGNIFICANT CHANGE UP
HBV CORE IGM SER-ACNC: NONREACTIVE — SIGNIFICANT CHANGE UP
HBV SURFACE AG SER-ACNC: NONREACTIVE — SIGNIFICANT CHANGE UP
HCT VFR BLD CALC: 26.5 % — LOW (ref 34.5–45)
HCV AB S/CO SERPL IA: 0.09 S/CO — SIGNIFICANT CHANGE UP (ref 0–0.99)
HCV AB S/CO SERPL IA: 0.1 S/CO — SIGNIFICANT CHANGE UP (ref 0–0.99)
HCV AB SERPL-IMP: SIGNIFICANT CHANGE UP
HCV AB SERPL-IMP: SIGNIFICANT CHANGE UP
HGB BLD-MCNC: 7.7 G/DL — LOW (ref 11.5–15.5)
HIV 1+2 AB+HIV1 P24 AG SERPL QL IA: SIGNIFICANT CHANGE UP
IRON SATN MFR SERPL: 197 UG/DL — SIGNIFICANT CHANGE UP (ref 140–530)
IRON SATN MFR SERPL: 68 UG/DL — SIGNIFICANT CHANGE UP (ref 30–160)
MCHC RBC-ENTMCNC: 23.1 PG — LOW (ref 27–34)
MCHC RBC-ENTMCNC: 29.1 % — LOW (ref 32–36)
MCV RBC AUTO: 79.3 FL — LOW (ref 80–100)
NRBC # FLD: 0 K/UL — SIGNIFICANT CHANGE UP (ref 0–0)
OB PNL STL: NEGATIVE — SIGNIFICANT CHANGE UP
PLATELET # BLD AUTO: 148 K/UL — LOW (ref 150–400)
PMV BLD: 11 FL — SIGNIFICANT CHANGE UP (ref 7–13)
RBC # BLD: 3.34 M/UL — LOW (ref 3.8–5.2)
RBC # FLD: 14.8 % — HIGH (ref 10.3–14.5)
TROPONIN T, HIGH SENSITIVITY: 25 NG/L — SIGNIFICANT CHANGE UP (ref ?–14)
TROPONIN T, HIGH SENSITIVITY: 28 NG/L — SIGNIFICANT CHANGE UP (ref ?–14)
UIBC SERPL-MCNC: 128.5 UG/DL — SIGNIFICANT CHANGE UP (ref 110–370)
VIT B12 SERPL-MCNC: 618 PG/ML — SIGNIFICANT CHANGE UP (ref 200–900)
WBC # BLD: 8.4 K/UL — SIGNIFICANT CHANGE UP (ref 3.8–10.5)
WBC # FLD AUTO: 8.4 K/UL — SIGNIFICANT CHANGE UP (ref 3.8–10.5)

## 2019-08-22 PROCEDURE — 93010 ELECTROCARDIOGRAM REPORT: CPT

## 2019-08-22 PROCEDURE — 71275 CT ANGIOGRAPHY CHEST: CPT | Mod: 26

## 2019-08-22 PROCEDURE — 99255 IP/OBS CONSLTJ NEW/EST HI 80: CPT | Mod: GC

## 2019-08-22 PROCEDURE — 99233 SBSQ HOSP IP/OBS HIGH 50: CPT | Mod: GC

## 2019-08-22 RX ORDER — HYDROXYCHLOROQUINE SULFATE 200 MG
200 TABLET ORAL DAILY
Refills: 0 | Status: DISCONTINUED | OUTPATIENT
Start: 2019-08-22 | End: 2019-08-22

## 2019-08-22 RX ORDER — CYCLOBENZAPRINE HYDROCHLORIDE 10 MG/1
5 TABLET, FILM COATED ORAL THREE TIMES A DAY
Refills: 0 | Status: DISCONTINUED | OUTPATIENT
Start: 2019-08-22 | End: 2019-08-27

## 2019-08-22 RX ORDER — SALIVA SUBSTITUTE COMB NO.11 351 MG
30 POWDER IN PACKET (EA) MUCOUS MEMBRANE THREE TIMES A DAY
Refills: 0 | Status: DISCONTINUED | OUTPATIENT
Start: 2019-08-22 | End: 2019-08-25

## 2019-08-22 RX ORDER — ACETAMINOPHEN 500 MG
650 TABLET ORAL ONCE
Refills: 0 | Status: COMPLETED | OUTPATIENT
Start: 2019-08-22 | End: 2019-08-22

## 2019-08-22 RX ORDER — HYDROXYCHLOROQUINE SULFATE 200 MG
200 TABLET ORAL DAILY
Refills: 0 | Status: DISCONTINUED | OUTPATIENT
Start: 2019-08-23 | End: 2019-08-26

## 2019-08-22 RX ORDER — SIMETHICONE 80 MG/1
80 TABLET, CHEWABLE ORAL ONCE
Refills: 0 | Status: COMPLETED | OUTPATIENT
Start: 2019-08-22 | End: 2019-08-22

## 2019-08-22 RX ORDER — IBUPROFEN 200 MG
400 TABLET ORAL EVERY 6 HOURS
Refills: 0 | Status: DISCONTINUED | OUTPATIENT
Start: 2019-08-22 | End: 2019-08-25

## 2019-08-22 RX ORDER — OXYCODONE HYDROCHLORIDE 5 MG/1
5 TABLET ORAL EVERY 4 HOURS
Refills: 0 | Status: DISCONTINUED | OUTPATIENT
Start: 2019-08-22 | End: 2019-08-25

## 2019-08-22 RX ADMIN — Medication 650 MILLIGRAM(S): at 12:03

## 2019-08-22 RX ADMIN — CYCLOBENZAPRINE HYDROCHLORIDE 5 MILLIGRAM(S): 10 TABLET, FILM COATED ORAL at 11:36

## 2019-08-22 RX ADMIN — SIMETHICONE 80 MILLIGRAM(S): 80 TABLET, CHEWABLE ORAL at 22:54

## 2019-08-22 RX ADMIN — SODIUM CHLORIDE 125 MILLILITER(S): 9 INJECTION INTRAMUSCULAR; INTRAVENOUS; SUBCUTANEOUS at 05:50

## 2019-08-22 RX ADMIN — Medication 650 MILLIGRAM(S): at 11:33

## 2019-08-22 RX ADMIN — Medication 1 DROP(S): at 22:23

## 2019-08-22 RX ADMIN — Medication 100 MILLIGRAM(S): at 05:50

## 2019-08-22 RX ADMIN — Medication 30 MILLILITER(S): at 11:32

## 2019-08-22 RX ADMIN — Medication 30 MILLILITER(S): at 22:23

## 2019-08-22 RX ADMIN — CYCLOBENZAPRINE HYDROCHLORIDE 5 MILLIGRAM(S): 10 TABLET, FILM COATED ORAL at 22:06

## 2019-08-22 NOTE — PROGRESS NOTE ADULT - PROBLEM SELECTOR PLAN 1
-Rheum consult  -SSA/SSB abs  -nora  -RF  -IGs  -ESR  -r/o HTLV-1, HCV, HIV  -Optho consult for schirmer test  - consult for insurance Given her autoimmune history  -f/u TTE

## 2019-08-22 NOTE — PROGRESS NOTE ADULT - PROBLEM SELECTOR PLAN 3
Low suspicion for infectious etiology. Afebrile, nontender to exam. Patient is vaccinated for mumps. Sore throat 2/2 inflammatory disease of SS. -Rheum recs appreciated   -f/u TODD, dsDNA, Sjogrens, RF, CCP, urine microscopy, strep panel, immunoglobulins and IgG subsets, quantiferon, MMRV, EBV  -r/o HTLV-1, HCV, HIV

## 2019-08-22 NOTE — PROGRESS NOTE ADULT - PROBLEM SELECTOR PLAN 4
DVT PPx: IMPROVE score 0, no ppx  Diet: regular  Dispo: pending workup Most likely anemia of chronic dz  -neg FOBT  -increased ferritin, o/w nL iron studies  - trend cbc q12h

## 2019-08-22 NOTE — PROGRESS NOTE ADULT - ASSESSMENT
54 yo F who denies any significant PMHx p/w dry mouth, dry vagina, dyspareunia x4 months followed by dry eyes, fatigue on exertion, sore throat, diffuse arthralgias, and orthostatic palpitations x 1 month concerning for Sjogren's syndrome found to have anemia in setting of palpitations and fatigue concerning for blood loss anemia vs. anemia of chronic disease. 54 yo F w/ PMHx of anxiety, sialoadenitis/ sjrogrens syndrome p/w dizziness, palpitations, and sweating with an episode of CP this AM. Differential ddx include PE, pericarditis, and anxiety.

## 2019-08-22 NOTE — PROGRESS NOTE ADULT - PROBLEM SELECTOR PLAN 2
Anemia 2/2 blood loss anemia or anemia of chronic disease. Patient denies melena, however, has never had a colonoscopy and is mildly microcytic (MCV 78.6) and palpitations provoked changing position concerning for orthostasis suggesting blood loss.  -Iron studies  -FOBT Given her sxs of palpitations, tachycardia, and CP  -f/u CTA chest

## 2019-08-22 NOTE — CONSULT NOTE ADULT - ASSESSMENT
Please obtain: TODD, dsDNA, Sjogrens, RF, CCP, urine microscopy, strep panel, immunoglobulins and IgG subsets    Pending further recommendations Please obtain: TODD, dsDNA, Sjogrens, RF, CCP, urine microscopy, strep panel, immunoglobulins and IgG subsets, quantiferon    Pending further recommendations Please obtain: TODD, dsDNA, Sjogrens, RF, CCP, urine microscopy, strep panel, immunoglobulins and IgG subsets, quantiferon, MMRV, EBV    Pending further recommendations Autoimmune sialoadenitis, c/f Sjogren's syndrome vs IgG4- related diseases.    Please obtain: TODD, dsDNA, Sjogrens, RF, CCP, urine microscopy, strep panel, immunoglobulins and IgG subsets, quantiferon, MMRV, EBV  NSAIDs if no c/i  Sialagogues, warm compresses  Avoid anticholinergics  Pending further recommendations Autoimmune sialoadenitis, c/f Sjogren's syndrome vs IgG4- related diseases.    Please obtain: TODD, dsDNA, Sjogrens, RF, CCP, urine microscopy, strep panel, immunoglobulins and IgG subsets, quantiferon, MMRV, EBV  NSAIDs if no c/i  Sialagogues, warm compresses, parotid gland massage  Avoid anticholinergics  Pending further recommendations Pt with constellation of sx of polyarthralgias, malaise, unintentional weight loss, sore throat.  MRI neck with pharyngitis, multiple lymphadenopathy, and L parotid gland calcifications.  Sialoadenitis could be infectious vs inflammatory  Autoimmune sialoadenitis, c/f Sjogren's syndrome vs IgG4- related diseases.  Microcytic anemia    Please obtain: TODD, dsDNA, Sjogrens, RF, CCP, urine microscopy, strep panel, immunoglobulins and IgG subsets, quantiferon, MMRV, EBV  NSAIDs if no c/i  Sialagogues, warm compresses, parotid gland massage  Avoid anticholinergics  Pending further recommendations Pt with constellation of sx of polyarthralgias, malaise, unintentional weight loss, sore throat.  MRI neck with pharyngitis, cervical and axillary lymphadenopathy, and parotid gland calcifications.  Sialoadenitis could be infectious vs inflammatory. Pt currently on IV abx.  Autoimmune sialoadenitis, c/f Sjogren's syndrome vs IgG4- related diseases.  Microcytic anemia likely JORDY/AOCD overlap.  The anemia, unintentional weight loss and lymphadenopathy c/f malignancy. Lymphomas can be present in patients with a long history of Sjogrens. Other DDX for LAD are Sjogrens itself vs benign LAD in the setting of recent pharyngitis. There is no clinical concern for rheumatic fever at this time.    Recommendations:  -Please obtain: TODD, ROBINA, dsDNA, Sjogrens, RF, CCP, urine microscopy, strep panel, immunoglobulins and IgG subsets, quantiferon, MMRV, EBV, G6PD.  -Sialagogues (lozenges, lemon juice, eye drops), warm compresses, parotid gland massage  -Avoid anticholinergics  -Please start Plaquenil 200mg PO daily    Vashti Marina MD  Rheumatology Fellow, PGY4    s/d/w Dr. Rogers

## 2019-08-22 NOTE — PROGRESS NOTE ADULT - PROBLEM SELECTOR PLAN 5
Low suspicion for infectious etiology. Afebrile, nontender to exam. Patient is vaccinated for mumps. Sore throat 2/2 inflammatory disease of SS.  -strep culture

## 2019-08-22 NOTE — CONSULT NOTE ADULT - SUBJECTIVE AND OBJECTIVE BOX
JULISA MESA  [id number]    HISTORY OF PRESENT ILLNESS:    53yoF visiting from [place] without known PMHx p/w polyarthralgias, malaise, sore throat, unintentional weight loss.    PAST MEDICAL & SURGICAL HISTORY:  No pertinent past medical history  No significant past surgical history    Review of Systems:  Gen:  No fevers/chills, weight loss  HEENT: No blurry vision, no difficulty swallowing, no oral or nasal ulcers  CVS: No chest pain/palpitations  Resp: No SOB/wheezing  GI: No N/V/C/D/abdominal pain  MSK:  Skin: No new rashes  Neuro: No headaches    MEDICATIONS  (STANDING):  MEDICATIONS  (PRN):      Allergies  No Known Allergies  Intolerances      PERTINENT MEDICATION HISTORY:    SOCIAL HISTORY:  OCCUPATION:  TRAVEL HISTORY:    FAMILY HISTORY:  No pertinent family history in first degree relatives    Vital Signs Last 24 Hrs  T(C): 36.7 (22 Aug 2019 05:48), Max: 37 (21 Aug 2019 21:51)  T(F): 98.1 (22 Aug 2019 05:48), Max: 98.6 (21 Aug 2019 21:51)  HR: 58 (22 Aug 2019 09:11) (58 - 82)  BP: 128/76 (22 Aug 2019 09:11) (108/68 - 136/77)  BP(mean): --  RR: 18 (22 Aug 2019 05:48) (16 - 18)  SpO2: 100% (22 Aug 2019 05:48) (100% - 100%)    Physical Exam:  General: No apparent distress  HEENT: EOMI, MMM  CVS: +S1/S2, RRR, no murmurs/rubs/gallops  Resp: CTA b/l. No crackles/wheezing  GI: Soft, NT/ND +BS  MSK:  Neuro: AAOx3  Skin: no visible rashes    LABS:                        7.7    8.40  )-----------( 148      ( 22 Aug 2019 05:20 )             26.5     08-20    138  |  101  |  7   ----------------------------<  100<H>  4.1   |  29  |  0.56    Ca    9.2      20 Aug 2019 19:00    TPro  9.1<H>  /  Alb  3.8  /  TBili  0.5  /  DBili  x   /  AST  28  /  ALT  12  /  AlkPhos  72  -      Urinalysis Basic - ( 20 Aug 2019 19:00 )    Color: LIGHT YELLOW / Appearance: CLEAR / S.012 / pH: 8.0  Gluc: NEGATIVE / Ketone: NEGATIVE  / Bili: NEGATIVE / Urobili: NORMAL   Blood: NEGATIVE / Protein: 10 / Nitrite: NEGATIVE   Leuk Esterase: NEGATIVE / RBC: x / WBC x   Sq Epi: x / Non Sq Epi: x / Bacteria: x    RADIOLOGY & ADDITIONAL STUDIES:    C3 Complement, Serum (19 @ 18:18)    C3 Complement, Serum: 60.1 mg/dL    C4 Complement, Serum (19 @ 18:18)    C4 Complement, Serum: 20.2 mg/dL      EXAM:  CT NECK SOFT TISSUE IC        PROCEDURE DATE:  Aug 20 2019         INTERPRETATION:  HISTORY: Left-sided neck swelling.    Technique: Contrast-enhanced CT of the neck was performed.    Helically acquired images from the skull base to the aortic arch   following the administration of intravenous contrast were reformatted in   coronal and sagittal plane and viewed at bone and soft tissue window.    90 cc of Omnipaque was administered intravenously without complication   and 10 cc were discarded.    COMPARISON: None.    FINDINGS:    Mild prominence of the the left pharyngeal/parapharyngeal soft tissues,   which may be reflective of pharyngitis. The aerodigestive tract is   otherwise within normal limits. In particular, there is no masslike or   nodular enhancement. Nonspecific subcentimeter calcifications seen   adjacent to the left palatine tonsils.    Multiple prominent bilateral axillary and cervical chain lymph nodes.    The right parotid, submandibular and thyroid glands are within normal   limits. Heterogeneous appearance of prominent left parotid gland   containing scattered stippled calcifications without surrounding edema.    The vascular structures demonstrate normal enhancement. Collateral   vessels identified at the level of the left upper chest wall.    There is no soft tissue fluid collection or suspect mass lesion.    Mild multilevel degenerative changes of the cervical spine with anterior   ridging osteophytes noted in the mid cervical spine. There are no   suspicious osteolytic or blastic lesions.    The visualized intracranial and intraorbital compartments do not   demonstrate abnormal enhancement or mass effect. Prominent empty sella.    Complete opacification of the right maxillary sinus.    Partially imaged chest shows enlarged main pulmonary artery at the level   of bifurcation which may be seen in setting of pulmonary hypertension.    IMPRESSION:    Mild prominence of the the left pharyngeal/parapharyngeal soft tissues,   which may be reflectiveof pharyngitis. Recommend clinical correlation.   No abscess identified.     Multiple bilateral prominent axillary and cervical chain lymph nodes.     Heterogeneous appearance of prominent left parotid gland containing   scattered stippled calcifications without surrounding edema. Correlate   with prior studies and consider nonemergent follow-up with MR for better   characterization.    Complete opacification of the right maxillary sinus.              CARA LOUIS M.D., RADIOLOGY RESIDENT  This document has been electronically signed.  ARANZA SETH M.D., ATTENDING RADIOLOGIST  This document has been electronically signed. Aug 21 2019 12:28AM                  EXAM:  MR NECK SOFT TISSUE ONLY IC        PROCEDURE DATE:  Aug 21 2019         INTERPRETATION:  INDICATION:  Pharyngitis. Abnormal parotid glands seen   on prior study.    TECHNIQUE:  Multiplanar T1 weighted and STIR images were obtained before   contrast.  Diffusion-weighted images were obtained. Following intravenous   gadolinium, multiplanar T1 weighted fat suppressed images were obtained.   5 cc of Gadavist were administered. 2.5 cc were discarded.    COMPARISON EXAMINATION:  Neck CT 2019    FINDINGS:  AERODIGESTIVE TRACT: Mild symmetric prominence of the palatine tonsils   likely representing tonsillar hypertrophy. No nodularity or abnormal   enhancement.  LYMPH NODES:  Multiple prominent sized lymph nodes are seen bilaterally   although no significantly enlarged or pathologic-appearing nodes are   seen.   PAROTID GLANDS:  Heterogeneous in appearance bilaterally containing   multiple small nodules with prominent bilateral enhancement. Scattered   small calcifications were seenon the prior CT scan more notable on the   left.  SUBMANDIBULAR GLANDS:  Atrophic and fatty replaced bilaterally.  THYROID GLAND:  Normal.  BONES:  Normal.  SINONASAL CAVITY: Extensive right maxillary mucosal thickening with sinus   opacification. Mild ethmoid and left maxillary mucosal thickening.  MISCELLANEOUS:  None.    IMPRESSION:  Heterogeneous appearing parotid glands with prominent   enhancement as well as calcifications seen on prior CT scan. Atrophic   fatty replaced submandibular glands. Autoimmune sialoadenitis/Sjogren's   syndrome is a prime consideration    Multiple prominent sized cervical lymph nodes bilaterally.                  RAFA ELLIOTT M.D., ATTENDING RADIOLOGIST  This document has been electronically signed. Aug 21 2019  1:52PM JULISA MESA  [id number]    HISTORY OF PRESENT ILLNESS:    53yoF visiting from [place] without known PMHx p/w polyarthralgias, malaise, sore throat, unintentional weight loss.    PAST MEDICAL & SURGICAL HISTORY:  No pertinent past medical history  No significant past surgical history    Review of Systems:  Gen:  No fevers/chills, weight loss  HEENT: No blurry vision, no difficulty swallowing, no oral or nasal ulcers  CVS: No chest pain/palpitations  Resp: No SOB/wheezing  GI: No N/V/C/D/abdominal pain  MSK:  Skin: No new rashes  Neuro: No headaches    MEDICATIONS  (STANDING):  MEDICATIONS  (PRN):      Allergies  No Known Allergies  Intolerances      PERTINENT MEDICATION HISTORY:    SOCIAL HISTORY:  OCCUPATION:  TRAVEL HISTORY:    FAMILY HISTORY:  No pertinent family history in first degree relatives    Vital Signs Last 24 Hrs  T(C): 36.7 (22 Aug 2019 05:48), Max: 37 (21 Aug 2019 21:51)  T(F): 98.1 (22 Aug 2019 05:48), Max: 98.6 (21 Aug 2019 21:51)  HR: 58 (22 Aug 2019 09:11) (58 - 82)  BP: 128/76 (22 Aug 2019 09:11) (108/68 - 136/77)  BP(mean): --  RR: 18 (22 Aug 2019 05:48) (16 - 18)  SpO2: 100% (22 Aug 2019 05:48) (100% - 100%)    Physical Exam:  General: No apparent distress  HEENT: EOMI, MMM  CVS: +S1/S2, RRR, no murmurs/rubs/gallops  Resp: CTA b/l. No crackles/wheezing  GI: Soft, NT/ND +BS  MSK:  Neuro: AAOx3  Skin: no visible rashes    LABS:                        7.7    8.40  )-----------( 148      ( 22 Aug 2019 05:20 )             26.5     08-20    138  |  101  |  7   ----------------------------<  100<H>  4.1   |  29  |  0.56    Ca    9.2      20 Aug 2019 19:00    TPro  9.1<H>  /  Alb  3.8  /  TBili  0.5  /  DBili  x   /  AST  28  /  ALT  12  /  AlkPhos  72        Urinalysis Basic - ( 20 Aug 2019 19:00 )    Color: LIGHT YELLOW / Appearance: CLEAR / S.012 / pH: 8.0  Gluc: NEGATIVE / Ketone: NEGATIVE  / Bili: NEGATIVE / Urobili: NORMAL   Blood: NEGATIVE / Protein: 10 / Nitrite: NEGATIVE   Leuk Esterase: NEGATIVE / RBC: x / WBC x   Sq Epi: x / Non Sq Epi: x / Bacteria: x    RADIOLOGY & ADDITIONAL STUDIES:    Acute Hepatitis Panel (19 @ 18:15)    Hepatitis C Virus Interpretation: Nonreactive Hepatitis C AB  S/CO Ratio                        Interpretation  < 1.00                                   Non-Reactive  1.00 - 4.99                         Weakly-Reactive  >= 5.00                                Reactive  Non-Reactive: Aperson with a non-reactive HCV antibody  result is considered uninfected.  No further action is  needed unless recent infection is suspected.  In these  cases, consider repeat testing later to detect  seroconversion..  Weakly-Reactive: HCV antibody test is abnormal, HCV RNA  Qualitative test will follow.  Reactive: HCV antibody test is abnormal, HCV RNA  Qualitative test will follow.  Note: HCV antibody testing is performed on the Abbott   system.    Hepatitis C Virus S/CO Ratio: 0.10 S/CO    Hepatitis B Core IgM Antibody: Nonreactive    Hepatitis B Surface Antigen: Nonreactive    Hepatitis A IgM Antibody: Nonreactive        C3 Complement, Serum (19 @ 18:18)    C3 Complement, Serum: 60.1 mg/dL    C4 Complement, Serum (19 @ 18:18)    C4 Complement, Serum: 20.2 mg/dL      EXAM:  CT NECK SOFT TISSUE IC        PROCEDURE DATE:  Aug 20 2019         INTERPRETATION:  HISTORY: Left-sided neck swelling.    Technique: Contrast-enhanced CT of the neck was performed.    Helically acquired images from the skull base to the aortic arch   following the administration of intravenous contrast were reformatted in   coronal and sagittal plane and viewed at bone and soft tissue window.    90 cc of Omnipaque was administered intravenously without complication   and 10 cc were discarded.    COMPARISON: None.    FINDINGS:    Mild prominence of the the left pharyngeal/parapharyngeal soft tissues,   which may be reflective of pharyngitis. The aerodigestive tract is   otherwise within normal limits. In particular, there is no masslike or   nodular enhancement. Nonspecific subcentimeter calcifications seen   adjacent to the left palatine tonsils.    Multiple prominent bilateral axillary and cervical chain lymph nodes.    The right parotid, submandibular and thyroid glands are within normal   limits. Heterogeneous appearance of prominent left parotid gland   containing scattered stippled calcifications without surrounding edema.    The vascular structures demonstrate normal enhancement. Collateral   vessels identified at the level of the left upper chest wall.    There is no soft tissue fluid collection or suspect mass lesion.    Mild multilevel degenerative changes of the cervical spine with anterior   ridging osteophytes noted in the mid cervical spine. There are no   suspicious osteolytic or blastic lesions.    The visualized intracranial and intraorbital compartments do not   demonstrate abnormal enhancement or mass effect. Prominent empty sella.    Complete opacification of the right maxillary sinus.    Partially imaged chest shows enlarged main pulmonary artery at the level   of bifurcation which may be seen in setting of pulmonary hypertension.    IMPRESSION:    Mild prominence of the the left pharyngeal/parapharyngeal soft tissues,   which may be reflectiveof pharyngitis. Recommend clinical correlation.   No abscess identified.     Multiple bilateral prominent axillary and cervical chain lymph nodes.     Heterogeneous appearance of prominent left parotid gland containing   scattered stippled calcifications without surrounding edema. Correlate   with prior studies and consider nonemergent follow-up with MR for better   characterization.    Complete opacification of the right maxillary sinus.              CARA LOUIS M.D., RADIOLOGY RESIDENT  This document has been electronically signed.  ARANZA SETH M.D., ATTENDING RADIOLOGIST  This document has been electronically signed. Aug 21 2019 12:28AM                  EXAM:  MR NECK SOFT TISSUE ONLY IC        PROCEDURE DATE:  Aug 21 2019         INTERPRETATION:  INDICATION:  Pharyngitis. Abnormal parotid glands seen   on prior study.    TECHNIQUE:  Multiplanar T1 weighted and STIR images were obtained before   contrast.  Diffusion-weighted images were obtained. Following intravenous   gadolinium, multiplanar T1 weighted fat suppressed images were obtained.   5 cc of Gadavist were administered. 2.5 cc were discarded.    COMPARISON EXAMINATION:  Neck CT 2019    FINDINGS:  AERODIGESTIVE TRACT: Mild symmetric prominence of the palatine tonsils   likely representing tonsillar hypertrophy. No nodularity or abnormal   enhancement.  LYMPH NODES:  Multiple prominent sized lymph nodes are seen bilaterally   although no significantly enlarged or pathologic-appearing nodes are   seen.   PAROTID GLANDS:  Heterogeneous in appearance bilaterally containing   multiple small nodules with prominent bilateral enhancement. Scattered   small calcifications were seenon the prior CT scan more notable on the   left.  SUBMANDIBULAR GLANDS:  Atrophic and fatty replaced bilaterally.  THYROID GLAND:  Normal.  BONES:  Normal.  SINONASAL CAVITY: Extensive right maxillary mucosal thickening with sinus   opacification. Mild ethmoid and left maxillary mucosal thickening.  MISCELLANEOUS:  None.    IMPRESSION:  Heterogeneous appearing parotid glands with prominent   enhancement as well as calcifications seen on prior CT scan. Atrophic   fatty replaced submandibular glands. Autoimmune sialoadenitis/Sjogren's   syndrome is a prime consideration    Multiple prominent sized cervical lymph nodes bilaterally.                  RAFA ELLIOTT M.D., ATTENDING RADIOLOGIST  This document has been electronically signed. Aug 21 2019  1:52PM JULISA BONITA  [id number]    HISTORY OF PRESENT ILLNESS:    53yoF visiting from [place] without known PMHx p/w polyarthralgias, malaise, sore throat, unintentional weight loss.  Says she has hard bumps on the back of her cheeks. +very dry mouth. Not so much dry eyes.  Was evaluated in [place] and was told she possibly has an autoimmune process, but did not see a specialist. Also reports chronic anemia but doesnt know Hb baseline  Sometimes get joint pain diffusely, but not currently.    Denies f/c, oral/nasal ulcers, rash, photosensitivity, CP, SOB, palpitations, dysuria, frothy urine, hematuria, changes in vision, joint pain or swelling.    PAST MEDICAL & SURGICAL HISTORY:  No pertinent past medical history  No significant past surgical history    MEDICATIONS  (STANDING):  MEDICATIONS  (PRN):    Allergies  No Known Allergies  Intolerances    PERTINENT MEDICATION HISTORY:  SOCIAL HISTORY: denies toxic habits  TRAVEL HISTORY: [place]  FAMILY HISTORY: No pertinent family history in first degree relatives    Vital Signs Last 24 Hrs  T(C): 36.7 (22 Aug 2019 05:48), Max: 37 (21 Aug 2019 21:51)  T(F): 98.1 (22 Aug 2019 05:48), Max: 98.6 (21 Aug 2019 21:51)  HR: 58 (22 Aug 2019 09:11) (58 - 82)  BP: 128/76 (22 Aug 2019 09:11) (108/68 - 136/77)  BP(mean): --  RR: 18 (22 Aug 2019 05:48) (16 - 18)  SpO2: 100% (22 Aug 2019 05:48) (100% - 100%)    Physical Exam:  General: No apparent distress  HEENT: EOMI, dry oral cavity, fullness over L posterior cheek  CVS: +S1/S2, RRR, no murmurs/rubs/gallops  Resp: CTA b/l. No crackles/wheezing  GI: Soft, NT/ND +BS  MSK: No joint synovitis/erythema/warmth/tenderness  Neuro: AAOx3  Skin: no visible rashes, +axillary LAD b/l    LABS:                        7.7    8.40  )-----------( 148      ( 22 Aug 2019 05:20 )             26.5     08-20    138  |  101  |  7   ----------------------------<  100<H>  4.1   |  29  |  0.56    Ca    9.2      20 Aug 2019 19:00    TPro  9.1<H>  /  Alb  3.8  /  TBili  0.5  /  DBili  x   /  AST  28  /  ALT  12  /  AlkPhos  72  08-20      Urinalysis Basic - ( 20 Aug 2019 19:00 )    Color: LIGHT YELLOW / Appearance: CLEAR / S.012 / pH: 8.0  Gluc: NEGATIVE / Ketone: NEGATIVE  / Bili: NEGATIVE / Urobili: NORMAL   Blood: NEGATIVE / Protein: 10 / Nitrite: NEGATIVE   Leuk Esterase: NEGATIVE / RBC: x / WBC x   Sq Epi: x / Non Sq Epi: x / Bacteria: x    RADIOLOGY & ADDITIONAL STUDIES:    Acute Hepatitis Panel (19 @ 18:15)    Hepatitis C Virus Interpretation: Nonreactive Hepatitis C AB  S/CO Ratio                        Interpretation  < 1.00                                   Non-Reactive  1.00 - 4.99                         Weakly-Reactive  >= 5.00                                Reactive  Non-Reactive: Aperson with a non-reactive HCV antibody  result is considered uninfected.  No further action is  needed unless recent infection is suspected.  In these  cases, consider repeat testing later to detect  seroconversion..  Weakly-Reactive: HCV antibody test is abnormal, HCV RNA  Qualitative test will follow.  Reactive: HCV antibody test is abnormal, HCV RNA  Qualitative test will follow.  Note: HCV antibody testing is performed on the Abbott   system.    Hepatitis C Virus S/CO Ratio: 0.10 S/CO    Hepatitis B Core IgM Antibody: Nonreactive    Hepatitis B Surface Antigen: Nonreactive    Hepatitis A IgM Antibody: Nonreactive        C3 Complement, Serum (19 @ 18:18)    C3 Complement, Serum: 60.1 mg/dL    C4 Complement, Serum (19 @ 18:18)    C4 Complement, Serum: 20.2 mg/dL      EXAM:  CT NECK SOFT TISSUE IC        PROCEDURE DATE:  Aug 20 2019         INTERPRETATION:  HISTORY: Left-sided neck swelling.    Technique: Contrast-enhanced CT of the neck was performed.    Helically acquired images from the skull base to the aortic arch   following the administration of intravenous contrast were reformatted in   coronal and sagittal plane and viewed at bone and soft tissue window.    90 cc of Omnipaque was administered intravenously without complication   and 10 cc were discarded.    COMPARISON: None.    FINDINGS:    Mild prominence of the the left pharyngeal/parapharyngeal soft tissues,   which may be reflective of pharyngitis. The aerodigestive tract is   otherwise within normal limits. In particular, there is no masslike or   nodular enhancement. Nonspecific subcentimeter calcifications seen   adjacent to the left palatine tonsils.    Multiple prominent bilateral axillary and cervical chain lymph nodes.    The right parotid, submandibular and thyroid glands are within normal   limits. Heterogeneous appearance of prominent left parotid gland   containing scattered stippled calcifications without surrounding edema.    The vascular structures demonstrate normal enhancement. Collateral   vessels identified at the level of the left upper chest wall.    There is no soft tissue fluid collection or suspect mass lesion.    Mild multilevel degenerative changes of the cervical spine with anterior   ridging osteophytes noted in the mid cervical spine. There are no   suspicious osteolytic or blastic lesions.    The visualized intracranial and intraorbital compartments do not   demonstrate abnormal enhancement or mass effect. Prominent empty sella.    Complete opacification of the right maxillary sinus.    Partially imaged chest shows enlarged main pulmonary artery at the level   of bifurcation which may be seen in setting of pulmonary hypertension.    IMPRESSION:    Mild prominence of the the left pharyngeal/parapharyngeal soft tissues,   which may be reflectiveof pharyngitis. Recommend clinical correlation.   No abscess identified.     Multiple bilateral prominent axillary and cervical chain lymph nodes.     Heterogeneous appearance of prominent left parotid gland containing   scattered stippled calcifications without surrounding edema. Correlate   with prior studies and consider nonemergent follow-up with MR for better   characterization.    Complete opacification of the right maxillary sinus.              CARA LOUIS M.D., RADIOLOGY RESIDENT  This document has been electronically signed.  ARANZA SETH M.D., ATTENDING RADIOLOGIST  This document has been electronically signed. Aug 21 2019 12:28AM                  EXAM:  MR NECK SOFT TISSUE ONLY IC        PROCEDURE DATE:  Aug 21 2019         INTERPRETATION:  INDICATION:  Pharyngitis. Abnormal parotid glands seen   on prior study.    TECHNIQUE:  Multiplanar T1 weighted and STIR images were obtained before   contrast.  Diffusion-weighted images were obtained. Following intravenous   gadolinium, multiplanar T1 weighted fat suppressed images were obtained.   5 cc of Gadavist were administered. 2.5 cc were discarded.    COMPARISON EXAMINATION:  Neck CT 2019    FINDINGS:  AERODIGESTIVE TRACT: Mild symmetric prominence of the palatine tonsils   likely representing tonsillar hypertrophy. No nodularity or abnormal   enhancement.  LYMPH NODES:  Multiple prominent sized lymph nodes are seen bilaterally   although no significantly enlarged or pathologic-appearing nodes are   seen.   PAROTID GLANDS:  Heterogeneous in appearance bilaterally containing   multiple small nodules with prominent bilateral enhancement. Scattered   small calcifications were seenon the prior CT scan more notable on the   left.  SUBMANDIBULAR GLANDS:  Atrophic and fatty replaced bilaterally.  THYROID GLAND:  Normal.  BONES:  Normal.  SINONASAL CAVITY: Extensive right maxillary mucosal thickening with sinus   opacification. Mild ethmoid and left maxillary mucosal thickening.  MISCELLANEOUS:  None.    IMPRESSION:  Heterogeneous appearing parotid glands with prominent   enhancement as well as calcifications seen on prior CT scan. Atrophic   fatty replaced submandibular glands. Autoimmune sialoadenitis/Sjogren's   syndrome is a prime consideration    Multiple prominent sized cervical lymph nodes bilaterally.                  RAFA ELLIOTT M.D., ATTENDING RADIOLOGIST  This document has been electronically signed. Aug 21 2019  1:52PM JULISA MESA  [id number]    HISTORY OF PRESENT ILLNESS:    53yoF visiting from [place] with PMHx of anemia, p/w polyarthralgias, malaise, sore throat, unintentional weight loss. Says these symptoms started around .  Says she has hard bumps on the back of her cheeks. +very dry mouth, sometimes dry eyes, vaginal dryness. Occassional polyarthralgias. Intermittent pins and needles in palms and soles, but not currently.  Was evaluated in [place] and was told she possibly has an autoimmune process, but did not see a specialist. Also reports chronic anemia but doesnt know Hb baseline. Pt says she has been dieting to lose weight, but more recently weight loss is unintentional.    Denies f/c, oral/nasal ulcers, rash, photosensitivity, Raynauds, CP, SOB, palpitations, dysuria, frothy urine, hematuria, changes in vision, joint pain or swelling, h/o blood clots. 1 miscarriage in the past. Menopause in .    PAST MEDICAL & SURGICAL HISTORY:  No pertinent past medical history  No significant past surgical history    MEDICATIONS  (STANDING):  MEDICATIONS  (PRN):    Allergies  No Known Allergies  Intolerances    PERTINENT MEDICATION HISTORY:  SOCIAL HISTORY: denies toxic habits  TRAVEL HISTORY: [place]  FAMILY HISTORY: No pertinent family history in first degree relatives. No h/o blood clots or autoimmune diseases.    Vital Signs Last 24 Hrs  T(C): 36.7 (22 Aug 2019 05:48), Max: 37 (21 Aug 2019 21:51)  T(F): 98.1 (22 Aug 2019 05:48), Max: 98.6 (21 Aug 2019 21:51)  HR: 58 (22 Aug 2019 09:11) (58 - 82)  BP: 128/76 (22 Aug 2019 09:11) (108/68 - 136/77)  BP(mean): --  RR: 18 (22 Aug 2019 05:48) (16 - 18)  SpO2: 100% (22 Aug 2019 05:48) (100% - 100%)    Physical Exam:  General: No apparent distress  HEENT: EOMI, dry oral mucosa, poor dentition, fullness over posterior cheeks b/l  CVS: +S1/S2, RRR, no murmurs/rubs/gallops  Resp: CTA b/l. No crackles/wheezing  GI: Soft, NT/ND +BS  MSK: No joint synovitis/erythema/warmth/tenderness. FROM of all joints.  Neuro: AAOx3,  5/5 motor throughout, SILT  Skin: no visible rashes, +axillary LAD b/l-rubbery mobile nodules    LABS:                        7.7    8.40  )-----------( 148      ( 22 Aug 2019 05:20 )             26.5     08-    138  |  101  |  7   ----------------------------<  100<H>  4.1   |  29  |  0.56    Ca    9.2      20 Aug 2019 19:00    TPro  9.1<H>  /  Alb  3.8  /  TBili  0.5  /  DBili  x   /  AST  28  /  ALT  12  /  AlkPhos  72        Urinalysis Basic - ( 20 Aug 2019 19:00 )    Color: LIGHT YELLOW / Appearance: CLEAR / S.012 / pH: 8.0  Gluc: NEGATIVE / Ketone: NEGATIVE  / Bili: NEGATIVE / Urobili: NORMAL   Blood: NEGATIVE / Protein: 10 / Nitrite: NEGATIVE   Leuk Esterase: NEGATIVE / RBC: x / WBC x   Sq Epi: x / Non Sq Epi: x / Bacteria: x    RADIOLOGY & ADDITIONAL STUDIES:    Acute Hepatitis Panel (19 @ 18:15)    Hepatitis C Virus Interpretation: Nonreactive Hepatitis C AB  S/CO Ratio                        Interpretation  < 1.00                                   Non-Reactive  1.00 - 4.99                         Weakly-Reactive  >= 5.00                                Reactive  Non-Reactive: [place] with a non-reactive HCV antibody  result is considered uninfected.  No further action is  needed unless recent infection is suspected.  In these  cases, consider repeat testing later to detect  seroconversion..  Weakly-Reactive: HCV antibody test is abnormal, HCV RNA  Qualitative test will follow.  Reactive: HCV antibody test is abnormal, HCV RNA  Qualitative test will follow.  Note: HCV antibody testing is performed on the Abbott   system.    Hepatitis C Virus S/CO Ratio: 0.10 S/CO    Hepatitis B Core IgM Antibody: Nonreactive    Hepatitis B Surface Antigen: Nonreactive    Hepatitis A IgM Antibody: Nonreactive        C3 Complement, Serum (19 @ 18:18)    C3 Complement, Serum: 60.1 mg/dL    C4 Complement, Serum (19 @ 18:18)    C4 Complement, Serum: 20.2 mg/dL      EXAM:  CT NECK SOFT TISSUE IC        PROCEDURE DATE:  Aug 20 2019         INTERPRETATION:  HISTORY: Left-sided neck swelling.    Technique: Contrast-enhanced CT of the neck was performed.    Helically acquired images from the skull base to the aortic arch   following the administration of intravenous contrast were reformatted in   coronal and sagittal plane and viewed at bone and soft tissue window.    90 cc of Omnipaque was administered intravenously without complication   and 10 cc were discarded.    COMPARISON: None.    FINDINGS:    Mild prominence of the the left pharyngeal/parapharyngeal soft tissues,   which may be reflective of pharyngitis. The aerodigestive tract is   otherwise within normal limits. In particular, there is no masslike or   nodular enhancement. Nonspecific subcentimeter calcifications seen   adjacent to the left palatine tonsils.    Multiple prominent bilateral axillary and cervical chain lymph nodes.    The right parotid, submandibular and thyroid glands are within normal   limits. Heterogeneous appearance of prominent left parotid gland   containing scattered stippled calcifications without surrounding edema.    The vascular structures demonstrate normal enhancement. Collateral   vessels identified at the level of the left upper chest wall.    There is no soft tissue fluid collection or suspect mass lesion.    Mild multilevel degenerative changes of the cervical spine with anterior   ridging osteophytes noted in the mid cervical spine. There are no   suspicious osteolytic or blastic lesions.    The visualized intracranial and intraorbital compartments do not   demonstrate abnormal enhancement or mass effect. Prominent empty sella.    Complete opacification of the right maxillary sinus.    Partially imaged chest shows enlarged main pulmonary artery at the level   of bifurcation which may be seen in setting of pulmonary hypertension.    IMPRESSION:    Mild prominence of the the left pharyngeal/parapharyngeal soft tissues,   which may be reflectiveof pharyngitis. Recommend clinical correlation.   No abscess identified.     Multiple bilateral prominent axillary and cervical chain lymph nodes.     Heterogeneous appearance of prominent left parotid gland containing   scattered stippled calcifications without surrounding edema. Correlate   with prior studies and consider nonemergent follow-up with MR for better   characterization.    Complete opacification of the right maxillary sinus.              CARA LOUIS M.D., RADIOLOGY RESIDENT  This document has been electronically signed.  ARANZA SETH M.D., ATTENDING RADIOLOGIST  This document has been electronically signed. Aug 21 2019 12:28AM                  EXAM:  MR NECK SOFT TISSUE ONLY IC        PROCEDURE DATE:  Aug 21 2019         INTERPRETATION:  INDICATION:  Pharyngitis. Abnormal parotid glands seen   on prior study.    TECHNIQUE:  Multiplanar T1 weighted and STIR images were obtained before   contrast.  Diffusion-weighted images were obtained. Following intravenous   gadolinium, multiplanar T1 weighted fat suppressed images were obtained.   5 cc of Gadavist were administered. 2.5 cc were discarded.    COMPARISON EXAMINATION:  Neck CT 2019    FINDINGS:  AERODIGESTIVE TRACT: Mild symmetric prominence of the palatine tonsils   likely representing tonsillar hypertrophy. No nodularity or abnormal   enhancement.  LYMPH NODES:  Multiple prominent sized lymph nodes are seen bilaterally   although no significantly enlarged or pathologic-appearing nodes are   seen.   PAROTID GLANDS:  Heterogeneous in appearance bilaterally containing   multiple small nodules with prominent bilateral enhancement. Scattered   small calcifications were seenon the prior CT scan more notable on the   left.  SUBMANDIBULAR GLANDS:  Atrophic and fatty replaced bilaterally.  THYROID GLAND:  Normal.  BONES:  Normal.  SINONASAL CAVITY: Extensive right maxillary mucosal thickening with sinus   opacification. Mild ethmoid and left maxillary mucosal thickening.  MISCELLANEOUS:  None.    IMPRESSION:  Heterogeneous appearing parotid glands with prominent   enhancement as well as calcifications seen on prior CT scan. Atrophic   fatty replaced submandibular glands. Autoimmune sialoadenitis/Sjogren's   syndrome is a prime consideration    Multiple prominent sized cervical lymph nodes bilaterally.                  RAFA ELLIOTT M.D., ATTENDING RADIOLOGIST  This document has been electronically signed. Aug 21 2019  1:52PM            EXAM:  XR CHEST PA LAT 2V    PROCEDURE DATE:  Aug 20 2019       INTERPRETATION:  CLINICAL INFORMATION: Fever.    EXAM: PA and lateral views of the chest     COMPARISON: None available.    FINDINGS:    The heart size is normal. The cardiomediastinal silhouette is   unremarkable.    The lungs are clear. No pleural effusions or pneumothorax.    IMPRESSION:   Clear lungs.        CARA LOUIS M.D., RADIOLOGY RESIDENT  This document has been electronically signed.  ILENE BLANCHARD M.D., ATTENDING RADIOLOGIST  This document has been electronically signed. Aug 21 2019  6:25AM JULISA MESA  [id number]    HISTORY OF PRESENT ILLNESS:    53yoF visiting from [place] with PMHx of anemia, p/w polyarthralgias, malaise, sore throat, unintentional weight loss. Says these symptoms started around .  Says she has hard bumps on the back of her cheeks. +very dry mouth, sometimes dry eyes, vaginal dryness. Occassional polyarthralgias. Intermittent pins and needles in palms and soles, but not currently.  Was evaluated in [place] and was told she possibly has an autoimmune process, but did not see a specialist. Also reports chronic anemia but doesnt know Hb baseline. Pt says she has been dieting to lose weight, but more recently weight loss is unintentional, approx 20 lbs.     Denies f/c, oral/nasal ulcers, rash, photosensitivity, Raynauds, CP, SOB, palpitations, dysuria, frothy urine, hematuria, changes in vision, joint pain or swelling, h/o blood clots. 1 miscarriage in the past, 2 uncomplicated pregnancies. Menopause in .    PAST MEDICAL & SURGICAL HISTORY:  No pertinent past medical history  No significant past surgical history    MEDICATIONS  (STANDING):  MEDICATIONS  (PRN):    Allergies  No Known Allergies  Intolerances    PERTINENT MEDICATION HISTORY:  SOCIAL HISTORY: denies toxic habits  TRAVEL HISTORY: [place]  FAMILY HISTORY: No pertinent family history in first degree relatives. No h/o blood clots or autoimmune diseases.    Vital Signs Last 24 Hrs  T(C): 36.7 (22 Aug 2019 05:48), Max: 37 (21 Aug 2019 21:51)  T(F): 98.1 (22 Aug 2019 05:48), Max: 98.6 (21 Aug 2019 21:51)  HR: 58 (22 Aug 2019 09:11) (58 - 82)  BP: 128/76 (22 Aug 2019 09:11) (108/68 - 136/77)  BP(mean): --  RR: 18 (22 Aug 2019 05:48) (16 - 18)  SpO2: 100% (22 Aug 2019 05:48) (100% - 100%)    Physical Exam:  General: No apparent distress  HEENT: EOMI, dry oral mucosa, poor dentition, fullness over posterior cheeks b/l  CVS: +S1/S2, RRR, no murmurs/rubs/gallops  Resp: CTA b/l. No crackles/wheezing  GI: Soft, NT/ND +BS  MSK: No joint synovitis/erythema/warmth/tenderness. FROM of all joints.  Neuro: AAOx3,  5/5 motor throughout, SILT  Skin: no visible rashes, +axillary LAD b/l-rubbery mobile nodules    LABS:                        7.7    8.40  )-----------( 148      ( 22 Aug 2019 05:20 )             26.5         138  |  101  |  7   ----------------------------<  100<H>  4.1   |  29  |  0.56    Ca    9.2      20 Aug 2019 19:00    TPro  9.1<H>  /  Alb  3.8  /  TBili  0.5  /  DBili  x   /  AST  28  /  ALT  12  /  AlkPhos  72        Urinalysis Basic - ( 20 Aug 2019 19:00 )    Color: LIGHT YELLOW / Appearance: CLEAR / S.012 / pH: 8.0  Gluc: NEGATIVE / Ketone: NEGATIVE  / Bili: NEGATIVE / Urobili: NORMAL   Blood: NEGATIVE / Protein: 10 / Nitrite: NEGATIVE   Leuk Esterase: NEGATIVE / RBC: x / WBC x   Sq Epi: x / Non Sq Epi: x / Bacteria: x    RADIOLOGY & ADDITIONAL STUDIES:    Acute Hepatitis Panel (19 @ 18:15)    Hepatitis C Virus Interpretation: Nonreactive Hepatitis C AB  S/CO Ratio                        Interpretation  < 1.00                                   Non-Reactive  1.00 - 4.99                         Weakly-Reactive  >= 5.00                                Reactive  Non-Reactive: [place] with a non-reactive HCV antibody  result is considered uninfected.  No further action is  needed unless recent infection is suspected.  In these  cases, consider repeat testing later to detect  seroconversion..  Weakly-Reactive: HCV antibody test is abnormal, HCV RNA  Qualitative test will follow.  Reactive: HCV antibody test is abnormal, HCV RNA  Qualitative test will follow.  Note: HCV antibody testing is performed on the Abbott   system.    Hepatitis C Virus S/CO Ratio: 0.10 S/CO    Hepatitis B Core IgM Antibody: Nonreactive    Hepatitis B Surface Antigen: Nonreactive    Hepatitis A IgM Antibody: Nonreactive        C3 Complement, Serum (19 @ 18:18)    C3 Complement, Serum: 60.1 mg/dL    C4 Complement, Serum (19 @ 18:18)    C4 Complement, Serum: 20.2 mg/dL      EXAM:  CT NECK SOFT TISSUE IC        PROCEDURE DATE:  Aug 20 2019         INTERPRETATION:  HISTORY: Left-sided neck swelling.    Technique: Contrast-enhanced CT of the neck was performed.    Helically acquired images from the skull base to the aortic arch   following the administration of intravenous contrast were reformatted in   coronal and sagittal plane and viewed at bone and soft tissue window.    90 cc of Omnipaque was administered intravenously without complication   and 10 cc were discarded.    COMPARISON: None.    FINDINGS:    Mild prominence of the the left pharyngeal/parapharyngeal soft tissues,   which may be reflective of pharyngitis. The aerodigestive tract is   otherwise within normal limits. In particular, there is no masslike or   nodular enhancement. Nonspecific subcentimeter calcifications seen   adjacent to the left palatine tonsils.    Multiple prominent bilateral axillary and cervical chain lymph nodes.    The right parotid, submandibular and thyroid glands are within normal   limits. Heterogeneous appearance of prominent left parotid gland   containing scattered stippled calcifications without surrounding edema.    The vascular structures demonstrate normal enhancement. Collateral   vessels identified at the level of the left upper chest wall.    There is no soft tissue fluid collection or suspect mass lesion.    Mild multilevel degenerative changes of the cervical spine with anterior   ridging osteophytes noted in the mid cervical spine. There are no   suspicious osteolytic or blastic lesions.    The visualized intracranial and intraorbital compartments do not   demonstrate abnormal enhancement or mass effect. Prominent empty sella.    Complete opacification of the right maxillary sinus.    Partially imaged chest shows enlarged main pulmonary artery at the level   of bifurcation which may be seen in setting of pulmonary hypertension.    IMPRESSION:    Mild prominence of the the left pharyngeal/parapharyngeal soft tissues,   which may be reflectiveof pharyngitis. Recommend clinical correlation.   No abscess identified.     Multiple bilateral prominent axillary and cervical chain lymph nodes.     Heterogeneous appearance of prominent left parotid gland containing   scattered stippled calcifications without surrounding edema. Correlate   with prior studies and consider nonemergent follow-up with MR for better   characterization.    Complete opacification of the right maxillary sinus.              CARA LOUIS M.D., RADIOLOGY RESIDENT  This document has been electronically signed.  ARANZA SETH M.D., ATTENDING RADIOLOGIST  This document has been electronically signed. Aug 21 2019 12:28AM                  EXAM:  MR NECK SOFT TISSUE ONLY IC        PROCEDURE DATE:  Aug 21 2019         INTERPRETATION:  INDICATION:  Pharyngitis. Abnormal parotid glands seen   on prior study.    TECHNIQUE:  Multiplanar T1 weighted and STIR images were obtained before   contrast.  Diffusion-weighted images were obtained. Following intravenous   gadolinium, multiplanar T1 weighted fat suppressed images were obtained.   5 cc of Gadavist were administered. 2.5 cc were discarded.    COMPARISON EXAMINATION:  Neck CT 2019    FINDINGS:  AERODIGESTIVE TRACT: Mild symmetric prominence of the palatine tonsils   likely representing tonsillar hypertrophy. No nodularity or abnormal   enhancement.  LYMPH NODES:  Multiple prominent sized lymph nodes are seen bilaterally   although no significantly enlarged or pathologic-appearing nodes are   seen.   PAROTID GLANDS:  Heterogeneous in appearance bilaterally containing   multiple small nodules with prominent bilateral enhancement. Scattered   small calcifications were seenon the prior CT scan more notable on the   left.  SUBMANDIBULAR GLANDS:  Atrophic and fatty replaced bilaterally.  THYROID GLAND:  Normal.  BONES:  Normal.  SINONASAL CAVITY: Extensive right maxillary mucosal thickening with sinus   opacification. Mild ethmoid and left maxillary mucosal thickening.  MISCELLANEOUS:  None.    IMPRESSION:  Heterogeneous appearing parotid glands with prominent   enhancement as well as calcifications seen on prior CT scan. Atrophic   fatty replaced submandibular glands. Autoimmune sialoadenitis/Sjogren's   syndrome is a prime consideration    Multiple prominent sized cervical lymph nodes bilaterally.                  RAFA ELLIOTT M.D., ATTENDING RADIOLOGIST  This document has been electronically signed. Aug 21 2019  1:52PM            EXAM:  XR CHEST PA LAT 2V    PROCEDURE DATE:  Aug 20 2019       INTERPRETATION:  CLINICAL INFORMATION: Fever.    EXAM: PA and lateral views of the chest     COMPARISON: None available.    FINDINGS:    The heart size is normal. The cardiomediastinal silhouette is   unremarkable.    The lungs are clear. No pleural effusions or pneumothorax.    IMPRESSION:   Clear lungs.        CARA LOUIS M.D., RADIOLOGY RESIDENT  This document has been electronically signed.  ILENE BLANCHARD M.D., ATTENDING RADIOLOGIST  This document has been electronically signed. Aug 21 2019  6:25AM

## 2019-08-22 NOTE — PROGRESS NOTE ADULT - SUBJECTIVE AND OBJECTIVE BOX
Cassidy Valdivia ([place]) PGY-1  Pager: [phone]     Patient is a 53y old  Female who presents with a chief complaint of Anemia and Sjogren's syndrome (21 Aug 2019 22:02)      SUBJECTIVE / OVERNIGHT EVENTS:  No acute complaints over night. Denies any fevers/chills, headache, CP, SOB, abd pain, N/V/D, constipation, or leg swelling.       MEDICATIONS  (STANDING):  clindamycin IVPB 600 milliGRAM(s) IV Intermittent every 8 hours    MEDICATIONS  (PRN):          OBJECTIVE:  Vital Signs Last 24 Hrs  T(C): 36.7 (22 Aug 2019 05:48), Max: 37 (21 Aug 2019 21:51)  T(F): 98.1 (22 Aug 2019 05:48), Max: 98.6 (21 Aug 2019 21:51)  HR: 71 (22 Aug 2019 05:48) (71 - 82)  BP: 112/70 (22 Aug 2019 05:48) (108/68 - 136/77)  BP(mean): --  RR: 18 (22 Aug 2019 05:48) (16 - 18)  SpO2: 100% (22 Aug 2019 05:48) (100% - 100%)  PHYSICAL EXAM:  GENERAL: NAD, well-developed  HEAD:  Atraumatic, Normocephalic  EYES: EOMI, PERRLA, conjunctiva and sclera clear  NECK: Supple, No JVD  CHEST/LUNG: Clear to auscultation bilaterally; No wheeze  HEART: Regular rate and rhythm; No murmurs, rubs, or gallops  ABDOMEN: Soft, Nontender, Nondistended; Bowel sounds present  EXTREMITIES:  2+ Peripheral Pulses, No clubbing, cyanosis, or edema  PSYCH: AAOx3  NEUROLOGY: non-focal  SKIN: No rashes or lesions    CAPILLARY BLOOD GLUCOSE        I&O's Summary    21 Aug 2019 07:01  -  22 Aug 2019 07:00  --------------------------------------------------------  IN: 1575 mL / OUT: 0 mL / NET: 1575 mL              LABS:                        7.7    8.40  )-----------( 148      ( 22 Aug 2019 05:20 )             26.5     08-20    138  |  101  |  7   ----------------------------<  100<H>  4.1   |  29  |  0.56    Ca    9.2      20 Aug 2019 19:00    TPro  9.1<H>  /  Alb  3.8  /  TBili  0.5  /  DBili  x   /  AST  28  /  ALT  12  /  AlkPhos  72            Urinalysis Basic - ( 20 Aug 2019 19:00 )    Color: LIGHT YELLOW / Appearance: CLEAR / S.012 / pH: 8.0  Gluc: NEGATIVE / Ketone: NEGATIVE  / Bili: NEGATIVE / Urobili: NORMAL   Blood: NEGATIVE / Protein: 10 / Nitrite: NEGATIVE   Leuk Esterase: NEGATIVE / RBC: x / WBC x   Sq Epi: x / Non Sq Epi: x / Bacteria: x          RADIOLOGY & ADDITIONAL TESTS: Cassidy Valdivia ([place]) PGY-1  Pager: [phone]     Patient is a 53y old  Female who presents with a chief complaint of Anemia and Sjogren's syndrome (21 Aug 2019 22:02)      SUBJECTIVE / OVERNIGHT EVENTS:  No acute complaints over night.   complained of chest pain and back pain this AM.     MEDICATIONS  (STANDING):  clindamycin IVPB 600 milliGRAM(s) IV Intermittent every 8 hours    MEDICATIONS  (PRN):          OBJECTIVE:  Vital Signs Last 24 Hrs  T(C): 36.7 (22 Aug 2019 05:48), Max: 37 (21 Aug 2019 21:51)  T(F): 98.1 (22 Aug 2019 05:48), Max: 98.6 (21 Aug 2019 21:51)  HR: 71 (22 Aug 2019 05:48) (71 - 82)  BP: 112/70 (22 Aug 2019 05:48) (108/68 - 136/77)  BP(mean): --  RR: 18 (22 Aug 2019 05:48) (16 - 18)  SpO2: 100% (22 Aug 2019 05:48) (100% - 100%)    PHYSICAL EXAM:  GENERAL: NAD, well-developed  HEAD:  Atraumatic, Normocephalic  EYES: EOMI, PERRLA, conjunctiva and sclera clear  NECK: Supple, No JVD  CHEST/LUNG: Clear to auscultation bilaterally; No wheeze  HEART: Regular rate and rhythm; No murmurs, rubs, or gallops  ABDOMEN: Soft, Nontender, Nondistended; Bowel sounds present  EXTREMITIES:  No clubbing, cyanosis, or edema  PSYCH: AAOx3  NEUROLOGY: non-focal  SKIN: rashes on left arm. 4-5, 1 cm demarcated non pustular, non palpable white rash.    CAPILLARY BLOOD GLUCOSE        I&O's Summary    21 Aug 2019 07:01  -  22 Aug 2019 07:00  --------------------------------------------------------  IN: 1575 mL / OUT: 0 mL / NET: 1575 mL              LABS:                        7.7    8.40  )-----------( 148      ( 22 Aug 2019 05:20 )             26.5         138  |  101  |  7   ----------------------------<  100<H>  4.1   |  29  |  0.56    Ca    9.2      20 Aug 2019 19:00    TPro  9.1<H>  /  Alb  3.8  /  TBili  0.5  /  DBili  x   /  AST  28  /  ALT  12  /  AlkPhos  72            Urinalysis Basic - ( 20 Aug 2019 19:00 )    Color: LIGHT YELLOW / Appearance: CLEAR / S.012 / pH: 8.0  Gluc: NEGATIVE / Ketone: NEGATIVE  / Bili: NEGATIVE / Urobili: NORMAL   Blood: NEGATIVE / Protein: 10 / Nitrite: NEGATIVE   Leuk Esterase: NEGATIVE / RBC: x / WBC x   Sq Epi: x / Non Sq Epi: x / Bacteria: x          RADIOLOGY & ADDITIONAL TESTS: Cassidy Valdivia ([place]) PGY-1  Pager: [phone]     Patient is a 53y old  Female who presents with a chief complaint of Anemia and Sjogren's syndrome (21 Aug 2019 22:02)      SUBJECTIVE / OVERNIGHT EVENTS:  No acute complaints over night.   complained of chest pain and back pain this AM.     MEDICATIONS  (STANDING):  clindamycin IVPB 600 milliGRAM(s) IV Intermittent every 8 hours    MEDICATIONS  (PRN):          OBJECTIVE:  Vital Signs Last 24 Hrs  T(C): 36.7 (22 Aug 2019 05:48), Max: 37 (21 Aug 2019 21:51)  T(F): 98.1 (22 Aug 2019 05:48), Max: 98.6 (21 Aug 2019 21:51)  HR: 71 (22 Aug 2019 05:48) (71 - 82)  BP: 112/70 (22 Aug 2019 05:48) (108/68 - 136/77)  BP(mean): --  RR: 18 (22 Aug 2019 05:48) (16 - 18)  SpO2: 100% (22 Aug 2019 05:48) (100% - 100%)    PHYSICAL EXAM:  GENERAL: NAD, well-developed  HEAD:  Atraumatic, Normocephalic  EYES: EOMI, PERRLA, conjunctiva and sclera clear  NECK: Supple, No JVD  CHEST/LUNG: Clear to auscultation bilaterally; No wheeze  HEART: Regular rate and rhythm; No murmurs, rubs, or gallops  ABDOMEN: Soft, Nontender, Nondistended; Bowel sounds present  EXTREMITIES:  No clubbing, cyanosis, or edema  PSYCH: AAOx3  NEUROLOGY: non-focal  SKIN: rashes on left arm. 4-5, 1 cm demarcated non pustular, non palpable white rash.    CAPILLARY BLOOD GLUCOSE        I&O's Summary    21 Aug 2019 07:01  -  22 Aug 2019 07:00  --------------------------------------------------------  IN: 1575 mL / OUT: 0 mL / NET: 1575 mL              LABS:                        7.7    8.40  )-----------( 148      ( 22 Aug 2019 05:20 )             26.5         138  |  101  |  7   ----------------------------<  100<H>  4.1   |  29  |  0.56    Ca    9.2      20 Aug 2019 19:00    TPro  9.1<H>  /  Alb  3.8  /  TBili  0.5  /  DBili  x   /  AST  28  /  ALT  12  /  AlkPhos  72            Urinalysis Basic - ( 20 Aug 2019 19:00 )    Color: LIGHT YELLOW / Appearance: CLEAR / S.012 / pH: 8.0  Gluc: NEGATIVE / Ketone: NEGATIVE  / Bili: NEGATIVE / Urobili: NORMAL   Blood: NEGATIVE / Protein: 10 / Nitrite: NEGATIVE   Leuk Esterase: NEGATIVE / RBC: x / WBC x   Sq Epi: x / Non Sq Epi: x / Bacteria: x          RADIOLOGY & ADDITIONAL TESTS:    EKG tracing reviewed and interpreted: SUE

## 2019-08-23 LAB
ANA PAT FLD IF-IMP: SIGNIFICANT CHANGE UP
ANA TITR SER: SIGNIFICANT CHANGE UP
ANION GAP SERPL CALC-SCNC: 14 MMO/L — SIGNIFICANT CHANGE UP (ref 7–14)
BASOPHILS # BLD AUTO: 0.02 K/UL — SIGNIFICANT CHANGE UP (ref 0–0.2)
BASOPHILS NFR BLD AUTO: 0.3 % — SIGNIFICANT CHANGE UP (ref 0–2)
BUN SERPL-MCNC: 11 MG/DL — SIGNIFICANT CHANGE UP (ref 7–23)
CALCIUM SERPL-MCNC: 9.1 MG/DL — SIGNIFICANT CHANGE UP (ref 8.4–10.5)
CHLORIDE SERPL-SCNC: 103 MMOL/L — SIGNIFICANT CHANGE UP (ref 98–107)
CO2 SERPL-SCNC: 25 MMOL/L — SIGNIFICANT CHANGE UP (ref 22–31)
CREAT SERPL-MCNC: 0.6 MG/DL — SIGNIFICANT CHANGE UP (ref 0.5–1.3)
ENA RNP IGG SER-ACNC: > 8 — HIGH
ENA SM AB TITR SER: > 8 — HIGH
EOSINOPHIL # BLD AUTO: 0.02 K/UL — SIGNIFICANT CHANGE UP (ref 0–0.5)
EOSINOPHIL NFR BLD AUTO: 0.3 % — SIGNIFICANT CHANGE UP (ref 0–6)
GLUCOSE SERPL-MCNC: 65 MG/DL — LOW (ref 70–99)
HCT VFR BLD CALC: 30.5 % — LOW (ref 34.5–45)
HGB BLD-MCNC: 8.9 G/DL — LOW (ref 11.5–15.5)
IGA FLD-MCNC: 589 MG/DL — HIGH (ref 70–400)
IGG FLD-MCNC: 3031 MG/DL — HIGH (ref 700–1600)
IGM SERPL-MCNC: 107 MG/DL — SIGNIFICANT CHANGE UP (ref 40–230)
IMM GRANULOCYTES NFR BLD AUTO: 0.5 % — SIGNIFICANT CHANGE UP (ref 0–1.5)
LYMPHOCYTES # BLD AUTO: 2.29 K/UL — SIGNIFICANT CHANGE UP (ref 1–3.3)
LYMPHOCYTES # BLD AUTO: 36.8 % — SIGNIFICANT CHANGE UP (ref 13–44)
MAGNESIUM SERPL-MCNC: 1.9 MG/DL — SIGNIFICANT CHANGE UP (ref 1.6–2.6)
MCHC RBC-ENTMCNC: 23 PG — LOW (ref 27–34)
MCHC RBC-ENTMCNC: 29.2 % — LOW (ref 32–36)
MCV RBC AUTO: 78.8 FL — LOW (ref 80–100)
MEV IGG SER-ACNC: >300 AU/ML — SIGNIFICANT CHANGE UP
MEV IGG+IGM SER-IMP: POSITIVE — SIGNIFICANT CHANGE UP
MONOCYTES # BLD AUTO: 0.38 K/UL — SIGNIFICANT CHANGE UP (ref 0–0.9)
MONOCYTES NFR BLD AUTO: 6.1 % — SIGNIFICANT CHANGE UP (ref 2–14)
MUV AB SER-ACNC: NEGATIVE — SIGNIFICANT CHANGE UP
MUV IGG FLD-ACNC: 7.1 AU/ML — SIGNIFICANT CHANGE UP
NEUTROPHILS # BLD AUTO: 3.49 K/UL — SIGNIFICANT CHANGE UP (ref 1.8–7.4)
NEUTROPHILS NFR BLD AUTO: 56 % — SIGNIFICANT CHANGE UP (ref 43–77)
NRBC # FLD: 0 K/UL — SIGNIFICANT CHANGE UP (ref 0–0)
PHOSPHATE SERPL-MCNC: 3.8 MG/DL — SIGNIFICANT CHANGE UP (ref 2.5–4.5)
PLATELET # BLD AUTO: 150 K/UL — SIGNIFICANT CHANGE UP (ref 150–400)
PMV BLD: 10.8 FL — SIGNIFICANT CHANGE UP (ref 7–13)
POTASSIUM SERPL-MCNC: 3.9 MMOL/L — SIGNIFICANT CHANGE UP (ref 3.5–5.3)
POTASSIUM SERPL-SCNC: 3.9 MMOL/L — SIGNIFICANT CHANGE UP (ref 3.5–5.3)
RBC # BLD: 3.87 M/UL — SIGNIFICANT CHANGE UP (ref 3.8–5.2)
RBC # FLD: 15 % — HIGH (ref 10.3–14.5)
RUBV IGG SER-ACNC: 6.4 INDEX — SIGNIFICANT CHANGE UP
RUBV IGG SER-IMP: POSITIVE — SIGNIFICANT CHANGE UP
SODIUM SERPL-SCNC: 142 MMOL/L — SIGNIFICANT CHANGE UP (ref 135–145)
SPECIMEN SOURCE: SIGNIFICANT CHANGE UP
WBC # BLD: 6.23 K/UL — SIGNIFICANT CHANGE UP (ref 3.8–10.5)
WBC # FLD AUTO: 6.23 K/UL — SIGNIFICANT CHANGE UP (ref 3.8–10.5)

## 2019-08-23 PROCEDURE — 99232 SBSQ HOSP IP/OBS MODERATE 35: CPT | Mod: GC

## 2019-08-23 PROCEDURE — 99231 SBSQ HOSP IP/OBS SF/LOW 25: CPT | Mod: GC

## 2019-08-23 RX ADMIN — Medication 200 MILLIGRAM(S): at 12:32

## 2019-08-23 NOTE — PROGRESS NOTE ADULT - ASSESSMENT
52 yo F w/ PMHx of anxiety, sialoadenitis/ sjrogrens syndrome p/w dizziness, palpitations, and sweating with an episode of CP this AM. Differential ddx include PE, pericarditis, and anxiety. 52 yo F w/ PMHx of anxiety, sialoadenitis/ sjrogrens syndrome p/w dizziness, palpitations, and sweating with an episode of CP. Differential ddx include PE, pericarditis, and anxiety.

## 2019-08-23 NOTE — PROGRESS NOTE ADULT - PROBLEM SELECTOR PLAN 5
Low suspicion for infectious etiology. Afebrile, nontender to exam. Patient is vaccinated for mumps. Sore throat 2/2 inflammatory disease of SS.  -strep culture Low suspicion for infectious etiology. Afebrile, nontender to exam. Patient is vaccinated for mumps. Sore throat 2/2 inflammatory disease of SS.  -strep culture (negative)

## 2019-08-23 NOTE — PROGRESS NOTE ADULT - PROBLEM SELECTOR PLAN 4
Most likely anemia of chronic dz  -neg FOBT  -increased ferritin, o/w nL iron studies  - trend cbc q12h

## 2019-08-23 NOTE — PROGRESS NOTE ADULT - SUBJECTIVE AND OBJECTIVE BOX
JULISA MESA  [id number]    INTERVAL HPI/OVERNIGHT EVENTS: No acute events overnight. Patient sitting up in bed, appears comfortable. States sore throat has resolved, pt denies any soreness. States her arthralgias have resolved now as well (when initially admitted, she had bilateral elbow joint pains as well as left wrist- however, no pain today). Denies fevers during admission.     MEDICATIONS  (STANDING):  hydroxychloroquine 200 milliGRAM(s) Oral daily    MEDICATIONS  (PRN):  artificial tears (preservative free) Ophthalmic Solution 1 Drop(s) Both EYES three times a day PRN Dry Eyes  cyclobenzaprine 5 milliGRAM(s) Oral three times a day PRN Muscle Spasm  ibuprofen  Tablet. 400 milliGRAM(s) Oral every 6 hours PRN Mild Pain (1 - 3), Moderate Pain (4 - 6)  oxyCODONE    IR 5 milliGRAM(s) Oral every 4 hours PRN Moderate Pain (4 - 6)  Saliva Substitute (CAPHOSOL) 30 milliLiter(s) Swish and Spit three times a day PRN Dry mouth      Allergies    penicillin (Rash)    Intolerances        Review of Systems:   General: No fevers/chills, no fatigue  HEENT: No blurry vision, dysphagia, or odynophagia. +dry eyes and mouth  CVS: No CP/palpitations  Resp: No SOB/wheezing  GI: No N/V/C/D/abdominal pain  MSK: no arthralgias or joint swelling at this time.   Skin: No new rashes  Neuro: No headaches      Vital Signs Last 24 Hrs  T(C): 36.8 (23 Aug 2019 05:52), Max: 36.8 (22 Aug 2019 21:32)  T(F): 98.2 (23 Aug 2019 05:52), Max: 98.2 (22 Aug 2019 21:32)  HR: 72 (23 Aug 2019 05:52) (72 - 93)  BP: 119/67 (23 Aug 2019 05:52) (112/60 - 141/95)  BP(mean): --  RR: 18 (23 Aug 2019 05:52) (18 - 19)  SpO2: 100% (23 Aug 2019 05:52) (100% - 100%)    Physical Exam:  General: NAD  HEENT: EOMI, MMM  Cardio: +S1/S2, RRR  Resp: CTA b/l  GI: +BS, soft, NT/ND  MSK: no joint synovitis or swelling particularly in b/l elbows, wrists, MCP/PIP or DIP Joints. 5/5 strength in extremities. Pt can stand and ambulate comfortably.   Neuro: AAOx3  Psych: wnl    LABS:                        8.9    6.23  )-----------( 150      ( 23 Aug 2019 05:49 )             30.5     08-23    142  |  103  |  11  ----------------------------<  65<L>  3.9   |  25  |  0.60    Ca    9.1      23 Aug 2019 05:49  Phos  3.8     08-23  Mg     1.9     08-23              RADIOLOGY & ADDITIONAL TESTS:

## 2019-08-23 NOTE — PROGRESS NOTE ADULT - ASSESSMENT
***NOT FINAL- TO BE DISCUSSED WITH ATTENDING ON ROUNDS***    53F with history of anemia and suspected autoimmune dz given hx of dry eyes, dry mouth, and arthralgias. Now admitted for polyarthralgias, 20 lb weight loss in 2 months, pharyngitis and parotid gland enlargement/calcifications, infectious vs. inflammatory.     Patient clinically improved at this time (no more sore throat or arthralgias), continue on plaquenil 200 mg daily.  Pending anti-CCP, RF, ROBINA, G6PD, and infectious workup (all in lab currently).    TODD 1:1280, speckled pattern, and anti-SSA Ab elevated >8, both consistent with Sjogrens syndrome. Quantitative IgA and IgG elevated consistent with inflammatory process. C3 level is low.     Generalized lymphadenopathy (enlarged axillary nodes bilaterally, and cervical chain lymph nodes seen on CTA chest) and other systemic symptoms are relatively less common in primary Sjogren's syndrome, so will follow up the remaining autoimmune workup to see if there is a secondary process also.     Recommendations:  - follow up autoimmune and infectious workup sent already.   - Sialagogues (lozenges, lemon juice, eye drops), warm compresses, parotid gland massage  -Avoid anticholinergics  - continue Plaquenil 200mg PO daily  - consider malignancy workup as well given anemia, 20 lb weight loss and rule out lymphoma in setting of Sjogrens.       Mala Doyle MD PGY3  Rheumatology- Resident ***NOT FINAL- TO BE DISCUSSED WITH ATTENDING ON ROUNDS***    53F with history of anemia and suspected autoimmune dz given hx of dry eyes, dry mouth, and arthralgias. Now admitted for polyarthralgias, 20 lb weight loss in 2 months, pharyngitis and parotid gland enlargement/calcifications, infectious vs. inflammatory.     Patient clinically improved at this time (no more sore throat or arthralgias), continue on plaquenil 200 mg daily.  Pending anti-CCP, RF, ROBINA, G6PD, and infectious workup (all in lab currently).    TODD 1:1280, speckled pattern, and anti-SSA Ab elevated >8, both consistent with Sjogrens syndrome. Quantitative IgA and IgG elevated consistent with inflammatory process. C3 level is low.     Generalized lymphadenopathy (enlarged axillary nodes bilaterally, and cervical chain lymph nodes seen on CTA chest) and other systemic symptoms are relatively less common in primary Sjogren's syndrome, so will follow up the remaining autoimmune workup to see if there is a secondary process also.     Recommendations:  - follow up autoimmune and infectious workup sent already. f/u G6PD as this will affect whether pt can continue on plaquenil.   - Sialagogues (lozenges, lemon juice, eye drops), warm compresses, parotid gland massage  -Avoid anticholinergics  - continue Plaquenil 200mg PO daily  - consider malignancy workup (review of peripheral smear per heme/onc service, and may need excisional biopsy of one of her lymph nodes)-  as well given anemia, 20 lb weight loss and rule out lymphoma which can develop in setting of Sjogrens.      Mala Doyle MD PGY3  Rheumatology- Resident 53F with history of anemia and suspected autoimmune dz given hx of dry eyes, dry mouth, and arthralgias. Now admitted for polyarthralgias, 20 lb weight loss in 2 months, pharyngitis and parotid gland enlargement/calcifications, infectious vs. inflammatory.     Patient clinically improved at this time (no more sore throat or arthralgias), continue on plaquenil 200 mg daily.  Pending anti-CCP, RF, ROBINA, G6PD, and infectious workup (all in lab currently).    TODD 1:1280, speckled pattern, and anti-SSA Ab elevated >8, both consistent with Sjogrens syndrome. Quantitative IgA and IgG elevated consistent with inflammatory process. C3 level is low.     Generalized lymphadenopathy (enlarged axillary nodes bilaterally, and cervical chain lymph nodes seen on CTA chest) and other systemic symptoms are relatively less common in primary Sjogren's syndrome, so will follow up the remaining autoimmune workup to see if there is a secondary process also.     Recommendations:  - follow up autoimmune and infectious workup sent already. f/u G6PD as this will affect whether pt can continue on plaquenil.   - Sialagogues (lozenges, lemon juice, eye drops), warm compresses, parotid gland massage  -Avoid anticholinergics  - continue Plaquenil 200mg PO daily  - consider malignancy workup (review of peripheral smear per heme/onc service, and may need excisional biopsy of one of her lymph nodes)-  as well given anemia, 20 lb weight loss and rule out lymphoma which can develop in setting of Sjogrens.      Mala Doyle MD PGY3  Rheumatology- Resident 53F with history of anemia and suspected autoimmune dz given hx of dry eyes, dry mouth, and arthralgias. Now admitted for polyarthralgias, 20 lb weight loss in 2 months, pharyngitis and parotid gland enlargement/calcifications, infectious vs. inflammatory.     Patient clinically improved at this time (no more sore throat or arthralgias), continue on plaquenil 200 mg daily.  Pending anti-CCP, RF, ROBINA, G6PD, and infectious workup (all in lab currently).    TODD 1:1280, speckled pattern, and anti-SSA Ab elevated >8, both consistent with Sjogrens syndrome. Quantitative IgA and IgG elevated consistent with inflammatory process. C3 level is low.     Generalized lymphadenopathy (enlarged axillary nodes bilaterally, and cervical chain lymph nodes seen on CTA chest) and other systemic symptoms are relatively less common in primary Sjogren's syndrome, so will follow up the remaining autoimmune workup to see if there is a secondary process also.     Recommendations:  - follow up autoimmune and infectious workup sent already. f/u G6PD.    - Sialagogues (lozenges, lemon juice, eye drops), warm compresses, parotid gland massage  - Avoid anticholinergics  - continue Plaquenil 200mg PO daily  - consider malignancy workup (review of peripheral smear per heme/onc service, and may need excisional biopsy of one of her lymph nodes)-  as well given anemia, 20 lb weight loss and rule out lymphoma which can develop in setting of Sjogrens.      Mala Doyle MD PGY3  Rheumatology- Resident

## 2019-08-23 NOTE — PROGRESS NOTE ADULT - PROBLEM SELECTOR PLAN 3
-Rheum recs appreciated   -f/u TODD, dsDNA, Sjogrens, RF, CCP, urine microscopy, strep panel, immunoglobulins and IgG subsets, quantiferon, MMRV, EBV  -r/o HTLV-1, HCV, HIV -Rheum recs appreciated   -f/u TODD, dsDNA, Sjogrens, RF, CCP, urine microscopy, strep panel, immunoglobulins and IgG subsets, quantiferon, MMRV, EBV  -r/o HTLV-1, HCV, HIV  -Will consult heme regarding diffuse LAD -Rheum recs appreciated   -f/u TODD, dsDNA, Sjogrens, RF, CCP, urine microscopy, strep panel, immunoglobulins and IgG subsets, quantiferon, MMRV, EBV  -r/o HTLV-1, HCV, HIV  -started on Plaquenil, c/w artificial tears and saliva substitute   -Will consult heme regarding diffuse LAD

## 2019-08-23 NOTE — PROGRESS NOTE ADULT - SUBJECTIVE AND OBJECTIVE BOX
Cassidy Valdivia ([place]) PGY-1  Pager: [phone]     Patient is a 53y old  Female who presents with a chief complaint of Anemia and Sjogren's syndrome (22 Aug 2019 09:48)      SUBJECTIVE / OVERNIGHT EVENTS:  No acute complaints over night. Denies any fevers/chills, headache, CP, SOB, abd pain, N/V/D, constipation, or leg swelling.       MEDICATIONS  (STANDING):  hydroxychloroquine 200 milliGRAM(s) Oral daily    MEDICATIONS  (PRN):  artificial tears (preservative free) Ophthalmic Solution 1 Drop(s) Both EYES three times a day PRN Dry Eyes  cyclobenzaprine 5 milliGRAM(s) Oral three times a day PRN Muscle Spasm  ibuprofen  Tablet. 400 milliGRAM(s) Oral every 6 hours PRN Mild Pain (1 - 3), Moderate Pain (4 - 6)  oxyCODONE    IR 5 milliGRAM(s) Oral every 4 hours PRN Moderate Pain (4 - 6)  Saliva Substitute (CAPHOSOL) 30 milliLiter(s) Swish and Spit three times a day PRN Dry mouth          OBJECTIVE:  Vital Signs Last 24 Hrs  T(C): 36.8 (23 Aug 2019 05:52), Max: 36.8 (22 Aug 2019 21:32)  T(F): 98.2 (23 Aug 2019 05:52), Max: 98.2 (22 Aug 2019 21:32)  HR: 72 (23 Aug 2019 05:52) (58 - 93)  BP: 119/67 (23 Aug 2019 05:52) (112/60 - 141/95)  BP(mean): --  RR: 18 (23 Aug 2019 05:52) (18 - 19)  SpO2: 100% (23 Aug 2019 05:52) (100% - 100%)  PHYSICAL EXAM:  GENERAL: NAD, well-developed  HEAD:  Atraumatic, Normocephalic  EYES: EOMI, PERRLA, conjunctiva and sclera clear  NECK: Supple, No JVD  CHEST/LUNG: Clear to auscultation bilaterally; No wheeze  HEART: Regular rate and rhythm; No murmurs, rubs, or gallops  ABDOMEN: Soft, Nontender, Nondistended; Bowel sounds present  EXTREMITIES:  2+ Peripheral Pulses, No clubbing, cyanosis, or edema  PSYCH: AAOx3  NEUROLOGY: non-focal  SKIN: No rashes or lesions    CAPILLARY BLOOD GLUCOSE        I&O's Summary    21 Aug 2019 07:01  -  22 Aug 2019 07:00  --------------------------------------------------------  IN: 1575 mL / OUT: 0 mL / NET: 1575 mL    22 Aug 2019 07:01  -  23 Aug 2019 06:30  --------------------------------------------------------  IN: 1400 mL / OUT: 0 mL / NET: 1400 mL              LABS:                        7.7    8.40  )-----------( 148      ( 22 Aug 2019 05:20 )             26.5             CARDIAC MARKERS ( 22 Aug 2019 12:00 )  x     / x     / 63 u/L / 2.93 ng/mL / x                RADIOLOGY & ADDITIONAL TESTS: Cassidy Valdivia ([place]) PGY-1  Pager: [phone]     Patient is a 53y old  Female who presents with a chief complaint of Anemia and Sjogren's syndrome (22 Aug 2019 09:48)      SUBJECTIVE / OVERNIGHT EVENTS:  No acute complaints over night. Denies any fevers/chills, headache, CP, SOB, abd pain, N/V/D, constipation, or leg swelling.       MEDICATIONS  (STANDING):  hydroxychloroquine 200 milliGRAM(s) Oral daily    MEDICATIONS  (PRN):  artificial tears (preservative free) Ophthalmic Solution 1 Drop(s) Both EYES three times a day PRN Dry Eyes  cyclobenzaprine 5 milliGRAM(s) Oral three times a day PRN Muscle Spasm  ibuprofen  Tablet. 400 milliGRAM(s) Oral every 6 hours PRN Mild Pain (1 - 3), Moderate Pain (4 - 6)  oxyCODONE    IR 5 milliGRAM(s) Oral every 4 hours PRN Moderate Pain (4 - 6)  Saliva Substitute (CAPHOSOL) 30 milliLiter(s) Swish and Spit three times a day PRN Dry mouth          OBJECTIVE:  Vital Signs Last 24 Hrs  T(C): 36.8 (23 Aug 2019 05:52), Max: 36.8 (22 Aug 2019 21:32)  T(F): 98.2 (23 Aug 2019 05:52), Max: 98.2 (22 Aug 2019 21:32)  HR: 72 (23 Aug 2019 05:52) (58 - 93)  BP: 119/67 (23 Aug 2019 05:52) (112/60 - 141/95)  BP(mean): --  RR: 18 (23 Aug 2019 05:52) (18 - 19)  SpO2: 100% (23 Aug 2019 05:52) (100% - 100%)    PHYSICAL EXAM:  GENERAL: NAD, well-developed  HEAD:  Atraumatic, Normocephalic  EYES: EOMI, PERRLA, conjunctiva and sclera clear  NECK: Supple, No JVD  CHEST/LUNG: Clear to auscultation bilaterally; No wheeze  HEART: Regular rate and rhythm; No murmurs, rubs, or gallops  ABDOMEN: Soft, Nontender, Nondistended; Bowel sounds present  EXTREMITIES:  No clubbing, cyanosis, or edema  PSYCH: AAOx3  NEUROLOGY: non-focal  SKIN: rashes on left arm. 4-5, 1 cm demarcated non pustular, non palpable white rash.      CAPILLARY BLOOD GLUCOSE        I&O's Summary    21 Aug 2019 07:01  -  22 Aug 2019 07:00  --------------------------------------------------------  IN: 1575 mL / OUT: 0 mL / NET: 1575 mL    22 Aug 2019 07:01  -  23 Aug 2019 06:30  --------------------------------------------------------  IN: 1400 mL / OUT: 0 mL / NET: 1400 mL              LABS:                        7.7    8.40  )-----------( 148      ( 22 Aug 2019 05:20 )             26.5             CARDIAC MARKERS ( 22 Aug 2019 12:00 )  x     / x     / 63 u/L / 2.93 ng/mL / x                RADIOLOGY & ADDITIONAL TESTS:

## 2019-08-24 ENCOUNTER — TRANSCRIPTION ENCOUNTER (OUTPATIENT)
Age: 54
End: 2019-08-24

## 2019-08-24 DIAGNOSIS — M54.5 LOW BACK PAIN: ICD-10-CM

## 2019-08-24 LAB
ANION GAP SERPL CALC-SCNC: 8 MMO/L — SIGNIFICANT CHANGE UP (ref 7–14)
BASOPHILS # BLD AUTO: 0.02 K/UL — SIGNIFICANT CHANGE UP (ref 0–0.2)
BASOPHILS NFR BLD AUTO: 0.4 % — SIGNIFICANT CHANGE UP (ref 0–2)
BUN SERPL-MCNC: 12 MG/DL — SIGNIFICANT CHANGE UP (ref 7–23)
CALCIUM SERPL-MCNC: 9 MG/DL — SIGNIFICANT CHANGE UP (ref 8.4–10.5)
CHLORIDE SERPL-SCNC: 103 MMOL/L — SIGNIFICANT CHANGE UP (ref 98–107)
CO2 SERPL-SCNC: 27 MMOL/L — SIGNIFICANT CHANGE UP (ref 22–31)
CREAT SERPL-MCNC: 0.56 MG/DL — SIGNIFICANT CHANGE UP (ref 0.5–1.3)
EOSINOPHIL # BLD AUTO: 0.02 K/UL — SIGNIFICANT CHANGE UP (ref 0–0.5)
EOSINOPHIL NFR BLD AUTO: 0.4 % — SIGNIFICANT CHANGE UP (ref 0–6)
GLUCOSE SERPL-MCNC: 86 MG/DL — SIGNIFICANT CHANGE UP (ref 70–99)
HCT VFR BLD CALC: 31.1 % — LOW (ref 34.5–45)
HGB BLD-MCNC: 9.1 G/DL — LOW (ref 11.5–15.5)
HTLV I+II AB PATRN SER RIPA-IMP: SIGNIFICANT CHANGE UP
IMM GRANULOCYTES NFR BLD AUTO: 0.8 % — SIGNIFICANT CHANGE UP (ref 0–1.5)
LYMPHOCYTES # BLD AUTO: 1.4 K/UL — SIGNIFICANT CHANGE UP (ref 1–3.3)
LYMPHOCYTES # BLD AUTO: 29.5 % — SIGNIFICANT CHANGE UP (ref 13–44)
MAGNESIUM SERPL-MCNC: 1.8 MG/DL — SIGNIFICANT CHANGE UP (ref 1.6–2.6)
MCHC RBC-ENTMCNC: 23.1 PG — LOW (ref 27–34)
MCHC RBC-ENTMCNC: 29.3 % — LOW (ref 32–36)
MCV RBC AUTO: 78.9 FL — LOW (ref 80–100)
MEV IGM SER-ACNC: <20 AU/ML — SIGNIFICANT CHANGE UP (ref 0–19.9)
MONOCYTES # BLD AUTO: 0.45 K/UL — SIGNIFICANT CHANGE UP (ref 0–0.9)
MONOCYTES NFR BLD AUTO: 9.5 % — SIGNIFICANT CHANGE UP (ref 2–14)
NEUTROPHILS # BLD AUTO: 2.81 K/UL — SIGNIFICANT CHANGE UP (ref 1.8–7.4)
NEUTROPHILS NFR BLD AUTO: 59.4 % — SIGNIFICANT CHANGE UP (ref 43–77)
NRBC # FLD: 0 K/UL — SIGNIFICANT CHANGE UP (ref 0–0)
PHOSPHATE SERPL-MCNC: 3.9 MG/DL — SIGNIFICANT CHANGE UP (ref 2.5–4.5)
PLATELET # BLD AUTO: 153 K/UL — SIGNIFICANT CHANGE UP (ref 150–400)
PMV BLD: 10.5 FL — SIGNIFICANT CHANGE UP (ref 7–13)
POTASSIUM SERPL-MCNC: 3.7 MMOL/L — SIGNIFICANT CHANGE UP (ref 3.5–5.3)
POTASSIUM SERPL-SCNC: 3.7 MMOL/L — SIGNIFICANT CHANGE UP (ref 3.5–5.3)
RBC # BLD: 3.94 M/UL — SIGNIFICANT CHANGE UP (ref 3.8–5.2)
RBC # FLD: 15 % — HIGH (ref 10.3–14.5)
SODIUM SERPL-SCNC: 138 MMOL/L — SIGNIFICANT CHANGE UP (ref 135–145)
WBC # BLD: 4.74 K/UL — SIGNIFICANT CHANGE UP (ref 3.8–10.5)
WBC # FLD AUTO: 4.74 K/UL — SIGNIFICANT CHANGE UP (ref 3.8–10.5)

## 2019-08-24 PROCEDURE — 93306 TTE W/DOPPLER COMPLETE: CPT | Mod: 26

## 2019-08-24 PROCEDURE — 99254 IP/OBS CNSLTJ NEW/EST MOD 60: CPT

## 2019-08-24 PROCEDURE — 99233 SBSQ HOSP IP/OBS HIGH 50: CPT | Mod: GC

## 2019-08-24 RX ORDER — HYDROXYCHLOROQUINE SULFATE 200 MG
1 TABLET ORAL
Qty: 0 | Refills: 0 | DISCHARGE
Start: 2019-08-24

## 2019-08-24 RX ORDER — CYCLOBENZAPRINE HYDROCHLORIDE 10 MG/1
1 TABLET, FILM COATED ORAL
Qty: 0 | Refills: 0 | DISCHARGE
Start: 2019-08-24

## 2019-08-24 RX ORDER — SALIVA SUBSTITUTE COMB NO.11 351 MG
30 POWDER IN PACKET (EA) MUCOUS MEMBRANE
Qty: 0 | Refills: 0 | DISCHARGE
Start: 2019-08-24

## 2019-08-24 RX ADMIN — Medication 200 MILLIGRAM(S): at 12:39

## 2019-08-24 NOTE — DISCHARGE NOTE PROVIDER - CARE PROVIDER_API CALL
Primary Care Doctor,   [place]  Phone: (   )    -  Fax: (   )    -  Follow Up Time: Lakshmi Marques)  Internal Medicine; Medical Oncology  [address] [place] [address]  Phone: [phone]  Fax: [phone]  Follow Up Time:     Lavern Rogers)  Internal Medicine; Rheumatology  [address] [place] [address]  Phone: [phone]  Fax: [phone]  Follow Up Time:

## 2019-08-24 NOTE — CONSULT NOTE ADULT - ATTENDING COMMENTS
I discussed the case and saw the patient with the fellow and agree with the above.     Lakshmi Rothman MD  Medical Oncology

## 2019-08-24 NOTE — PROGRESS NOTE ADULT - ASSESSMENT
52 yo F w/ PMHx of anxiety, sialoadenitis/ sjrogrens syndrome p/w dizziness, palpitations, and sweating with an episode of CP. Differential ddx include PE, pericarditis, and anxiety.

## 2019-08-24 NOTE — CONSULT NOTE ADULT - ASSESSMENT
52 yo F with Sjogren's disease not on treatment presented with dry mouth, dry vagina, dyspareunia x4 months followed by dry eyes, fatigue on exertion, sore throat, diffuse arthralgias, and orthostatic palpitations x 1 month and was admitted for the management of Sjogren's disease. Oncology was consulted to assess multiple lmphoadenopathies.     # Multiple lymphadenopathies  Right Ax, bilateral cervical chain noted in CTA. She lost 20 lbs for the past 2-3 months but no fever or night sweat. She needs biopsy and at this moment we cannot tell this is Lymphonma vs other sold malignanyc vs non-malignant others  -Please obtain excisional biopsy of one of any lymph nodes  -F/U pathology result once biopsy was done 52 yo F with Sjogren's disease not on treatment presented with dry mouth, dry vagina, dyspareunia x4 months followed by dry eyes, fatigue on exertion, sore throat, diffuse arthralgias, and orthostatic palpitations x 1 month and was admitted for the management of Sjogren's disease. Oncology was consulted to assess multiple lmphoadenopathies.     # Multiple lymphadenopathies  Right Ax, bilateral cervical chain noted in CTA. She lost 20 lbs for the past 2-3 months but no fever or night sweats. She needs biopsy and at this moment we cannot tell this is Lymphonma vs other sold malignanyc vs non-malignant etiologies  -Please obtain excisional biopsy of one of any lymph nodes  -F/U pathology result once biopsy was done

## 2019-08-24 NOTE — DISCHARGE NOTE PROVIDER - NSDCFUADDAPPT_GEN_ALL_CORE_FT
Please follow up with your doctors in [place]. Please follow up with your doctors in [place].    Rheumatology:     Heme/Onc Please follow up with your doctors in [place].    Rheumatology: Lavern Gonsalez. [address] [address]. Monday 9/9/19 at 3:30pm    Heme/Onc: Dr. Rothman. [address] [place] [address], Friday, 9/13/19 at 1pm.

## 2019-08-24 NOTE — PROGRESS NOTE ADULT - PROBLEM SELECTOR PLAN 5
Low suspicion for infectious etiology. Afebrile, nontender to exam. Patient is vaccinated for mumps. Sore throat 2/2 inflammatory disease of SS.  -strep culture (negative) Given her sxs of palpitations, tachycardia, and CP  -CTA chest. negative for PE

## 2019-08-24 NOTE — PROGRESS NOTE ADULT - PROBLEM SELECTOR PLAN 6
DVT PPx: IMPROVE score 0, no ppx  Diet: regular  Dispo: pending workup Low suspicion for infectious etiology. Afebrile, nontender to exam. Patient is vaccinated for mumps. Sore throat 2/2 inflammatory disease of SS.  -strep culture (negative)

## 2019-08-24 NOTE — DISCHARGE NOTE PROVIDER - PROVIDER TOKENS
FREE:[LAST:[Primary Care Doctor],PHONE:[(   )    -],FAX:[(   )    -],ADDRESS:[[place]]] PROVIDER:[TOKEN:[[id number]]],PROVIDER:[TOKEN:[[id number]]]

## 2019-08-24 NOTE — DISCHARGE NOTE PROVIDER - NSDCCPTREATMENT_GEN_ALL_CORE_FT
PRINCIPAL PROCEDURE  Procedure: CT biopsy lymph node  Findings and Treatment: You got a lymph node biopsy to rule out lymphoma. Please follow up with hematology outpatient for your results. PRINCIPAL PROCEDURE  Procedure: CT biopsy lymph node  Findings and Treatment: You got a lymph node biopsy to rule out lymphoma. Please follow up with hematology/oncology outpatient for your results.

## 2019-08-24 NOTE — DISCHARGE NOTE PROVIDER - HOSPITAL COURSE
Patient was treated symptomatically for her Sjrogrens syndrome with artifical tears and saliva with symptomatic improvement. She also complained of chest pain which resolved. Her CTA chest was negative for PE and TTE was ______. Patient's labs and clinical presentation is consistent with sjrogrens syndrome. She was started on plaquenil 20 qd without any side effects. Patient is stable for discharge. She was advised to follow up with her doctors back in [place]. 52 yo F w/ pmhx of anxiety p/w with palpitations. Patient was treated symptomatically for her Sjrogrens syndrome with artifical tears and saliva with symptomatic improvement. She also complained of chest pain which resolved. Her CTA chest was negative for PE and TTE was unremarkable for pericarditis. Patient's labs and clinical presentation is consistent with sjrogrens syndrome/ Lupus. She was started on plaquenil 20 qd without any side effects, she was then discontinued on plaquenil due to low G6PD. Patient was also worked up for lymphoma. s/p LN biopsy. Patient is advised to follow up outpatient for her LN bx resutls. Patient is stable for discharge. She was [place] or [place]

## 2019-08-24 NOTE — PROGRESS NOTE ADULT - PROBLEM SELECTOR PLAN 3
Most likely anemia of chronic dz  -neg FOBT  -increased ferritin, o/w nL iron studies  - trend cbc q12h resolved  - c/w flexeril 5mg po tid PRN for muscle spasm  - oxy 5 mgq4h PRN for severe pain  - motrin 400 mg q6h PRN for mild pain

## 2019-08-24 NOTE — CONSULT NOTE ADULT - SUBJECTIVE AND OBJECTIVE BOX
Hematology/Oncology Consult Note    HPI:  52 yo F with Sjogren's disease not on treatment presented with dry mouth, dry vagina, dyspareunia x4 months followed by dry eyes, fatigue on exertion, sore throat, diffuse arthralgias, and orthostatic palpitations x 1 month and was admitted for the management of Sjogren's disease. Oncology was consulted to assess multiple lmphoadenopathies.       PAST MEDICAL & SURGICAL HISTORY:  Sjogren's disease    FAMILY HISTORY:  No cancer in family      MEDICATIONS  (STANDING):  hydroxychloroquine 200 milliGRAM(s) Oral daily    MEDICATIONS  (PRN):  artificial tears (preservative free) Ophthalmic Solution 1 Drop(s) Both EYES three times a day PRN Dry Eyes  cyclobenzaprine 5 milliGRAM(s) Oral three times a day PRN Muscle Spasm  ibuprofen  Tablet. 400 milliGRAM(s) Oral every 6 hours PRN Mild Pain (1 - 3), Moderate Pain (4 - 6)  oxyCODONE    IR 5 milliGRAM(s) Oral every 4 hours PRN Moderate Pain (4 - 6)  Saliva Substitute (CAPHOSOL) 30 milliLiter(s) Swish and Spit three times a day PRN Dry mouth      Allergies  penicillin (Rash)          SOCIAL HISTORY:   EtOH: none  Tobacco: none  Drugs: none    REVIEW OF SYSTEMS:  General: Negative for chills, fever, night sweats POSITIVE FOR unintentional weight change, fatigue.  Head: Negative for HA  Eyes: POSITIVE FOR dry eye  ENT: Negative for tinnitus, vertigo, hearing impairment, postnasal drip, sore throat.  Respiratory: Negative for shortness of breath,   Cardiovascular: Negative for chest pain,  Gastrointestinal: Negative for dysphagia, nausea, vomiting, hematemesis, abdominal pain, diarrhea, constipation, hematochezia, tarry stool.  MSK: Negative for myalgia, joint pain, neck stiffness, weakness, back pain.  Neuro: Negative for weakness, loss of balance.   Skin: Negative for rash, erythema.  Psych: Negative for anxiety, depression, sleep disturbance.    VITAL SIGNS:    T(F): 98.2 (08-24-19 @ 15:20), Max: 98.6 (08-23-19 @ 21:25)  HR: 100 (08-24-19 @ 15:20)  BP: 121/86 (08-24-19 @ 15:20)  RR: 19 (08-24-19 @ 15:20)  SpO2: 100% (08-24-19 @ 15:20)  Wt(kg): --    PHYSICAL EXAMINATION:  Constitutional: NAD   Eyes: PERRLA, EOMI, sclera non-icteric, conjunctiva non-anemia  Neck: supple, no masses, no elevated JVP  Mouth: No mucosisit, no exudate, no ulcer.   Respiratory: CTA b/l  Cardiovascular: Normal rate regular rhythm. normal S1S2, no murmur  Gastrointestinal: soft and flat, no tenderness to palpation  LNs; right post cervical and right Ax Lns palpable  Extremities:  No c/c/e  MSK: No joint swelling, erythema, or tenderness   Neurological: AOx3, Cranial nervess II-XII grossly intact  Skin: No rash  Psych: Normal affect    LABS:                        9.1    4.74  )-----------( 153      ( 24 Aug 2019 06:45 )             31.1     08-24    138  |  103  |  12  ----------------------------<  86  3.7   |  27  |  0.56    Ca    9.0      24 Aug 2019 06:45  Phos  3.9     08-24  Mg     1.8     08-24       Phosphorus Level, Serum: 3.9 mg/dL (08-24 @ 06:45)  Magnesium, Serum: 1.8 mg/dL (08-24 @ 06:45)        RADIOLOGY & ADDITIONAL TESTS:  < from: CT Angio Chest w/ IV Cont (08.22.19 @ 15:42) >  Mediastinum and Heart: Thyroid gland is unremarkable. Aorta and pulmonary   arteries are normal in size. Enlarged right axillary nodes measuring up   to 2 x 1.3 cm likely related to known Sjogren's disease. No pericardial   effusion.

## 2019-08-24 NOTE — PROGRESS NOTE ADULT - PROBLEM SELECTOR PLAN 4
Given her sxs of palpitations, tachycardia, and CP  -CTA chest. negative for PE Most likely anemia of chronic dz  -neg FOBT  -increased ferritin, o/w nL iron studies  - trend cbc q12h

## 2019-08-24 NOTE — PROGRESS NOTE ADULT - PROBLEM SELECTOR PLAN 1
-Rheum recs appreciated   -reviewed rsults for TODD, dsDNA, Sjogrens, strep panel, immunoglobulins and IgG subsets,  MMRV,   - f/u RF, CCP, urine microscopy, quantiferon,EBV  - r/o HTLV-1, HCV, HIV (all neg)  - started on Plaquenil 200mg qd,  - c/w artificial tears and saliva substitute   -Will consult heme regarding diffuse LAD

## 2019-08-24 NOTE — DISCHARGE NOTE PROVIDER - NSDCCPCAREPLAN_GEN_ALL_CORE_FT
PRINCIPAL DISCHARGE DIAGNOSIS  Diagnosis: Sjogrens syndrome  Assessment and Plan of Treatment: You were treated for your dry eyes and dry mouth with artificial tears and saliva.   You were also started on a medication called plaquenil which will help with the joint pains. PRINCIPAL DISCHARGE DIAGNOSIS  Diagnosis: Sjogrens syndrome  Assessment and Plan of Treatment: You were treated for your dry eyes and dry mouth with artificial tears and saliva.   Please follow up with the scheduled appointments made for you. (Hematology & rheumatology).

## 2019-08-25 LAB
BACTERIA BLD CULT: SIGNIFICANT CHANGE UP
BACTERIA BLD CULT: SIGNIFICANT CHANGE UP
G6PD RBC-CCNC: 2.8 — LOW (ref 7–20.5)
MEV IGM SER-ACNC: NEGATIVE — SIGNIFICANT CHANGE UP
S PYO SPEC QL CULT: SIGNIFICANT CHANGE UP

## 2019-08-25 PROCEDURE — 99232 SBSQ HOSP IP/OBS MODERATE 35: CPT | Mod: GC

## 2019-08-25 RX ORDER — IBUPROFEN 200 MG
400 TABLET ORAL EVERY 6 HOURS
Refills: 0 | Status: DISCONTINUED | OUTPATIENT
Start: 2019-08-25 | End: 2019-08-27

## 2019-08-25 RX ORDER — SALIVA SUBSTITUTE COMB NO.11 351 MG
30 POWDER IN PACKET (EA) MUCOUS MEMBRANE THREE TIMES A DAY
Refills: 0 | Status: DISCONTINUED | OUTPATIENT
Start: 2019-08-25 | End: 2019-08-27

## 2019-08-25 RX ADMIN — Medication 1 DROP(S): at 13:00

## 2019-08-25 RX ADMIN — Medication 30 MILLILITER(S): at 13:00

## 2019-08-25 RX ADMIN — Medication 1 DROP(S): at 21:48

## 2019-08-25 RX ADMIN — Medication 200 MILLIGRAM(S): at 13:00

## 2019-08-25 RX ADMIN — Medication 30 MILLILITER(S): at 21:48

## 2019-08-25 NOTE — PROGRESS NOTE ADULT - ASSESSMENT
54 yo F w/ PMHx of anxiety, sialoadenitis/ sjrogrens syndrome p/w dizziness, palpitations, and sweating with an episode of CP. Differential ddx include PE, pericarditis, and anxiety. 54 yo F w/ PMHx of anxiety, sialoadenitis/ sjrogrens syndrome p/w dizziness, palpitations, and sweating with an episode of CP, negative cardiac enzymes. Now with significant sialoadenitis being managed on Plaquenil. With significant LAD, pending LN bx for diagnosis.

## 2019-08-25 NOTE — PROGRESS NOTE ADULT - PROBLEM SELECTOR PLAN 6
Low suspicion for infectious etiology. Afebrile, nontender to exam. Patient is vaccinated for mumps. Sore throat 2/2 inflammatory disease of SS.  -strep culture (negative)

## 2019-08-25 NOTE — PROGRESS NOTE ADULT - SUBJECTIVE AND OBJECTIVE BOX
Patient is a 53y old  Female who presents with a chief complaint of Anemia and Sjogren's syndrome (24 Aug 2019 18:59)      SUBJECTIVE/OVERNIGHT EVENTS     MEDICATIONS  (STANDING):  hydroxychloroquine 200 milliGRAM(s) Oral daily    MEDICATIONS  (PRN):  artificial tears (preservative free) Ophthalmic Solution 1 Drop(s) Both EYES three times a day PRN Dry Eyes  cyclobenzaprine 5 milliGRAM(s) Oral three times a day PRN Muscle Spasm  ibuprofen  Tablet. 400 milliGRAM(s) Oral every 6 hours PRN Mild Pain (1 - 3), Moderate Pain (4 - 6)  oxyCODONE    IR 5 milliGRAM(s) Oral every 4 hours PRN Moderate Pain (4 - 6)  Saliva Substitute (CAPHOSOL) 30 milliLiter(s) Swish and Spit three times a day PRN Dry mouth    CAPILLARY BLOOD GLUCOSE        I&O's Summary        Vital Signs Last 24 Hrs  T(C): 36.6 (25 Aug 2019 05:24), Max: 36.8 (24 Aug 2019 15:20)  T(F): 97.9 (25 Aug 2019 05:24), Max: 98.2 (24 Aug 2019 15:20)  HR: 80 (25 Aug 2019 05:24) (80 - 100)  BP: 105/73 (25 Aug 2019 05:24) (105/73 - 121/86)  BP(mean): --  RR: 20 (25 Aug 2019 05:24) (19 - 20)  SpO2: 100% (25 Aug 2019 05:24) (100% - 100%)    PHYSICAL EXAM:  GENERAL: NAD, well-developed  HEAD:  Atraumatic, Normocephalic  EYES: EOMI, PERRLA, conjunctiva and sclera clear  NECK: Supple, No JVD  CHEST/LUNG: Clear to auscultation bilaterally; No wheeze  HEART: Regular rate and rhythm; No murmurs, rubs, or gallops  ABDOMEN: Soft, Nontender, Nondistended; Bowel sounds present  EXTREMITIES:  2+ Peripheral Pulses, No clubbing, cyanosis, or edema  PSYCH: AAOx3  NEUROLOGY: non-focal  SKIN: No rashes or lesions    LABS                        9.1    4.74  )-----------( 153      ( 24 Aug 2019 06:45 )             31.1     08-24    138  |  103  |  12  ----------------------------<  86  3.7   |  27  |  0.56    Ca    9.0      24 Aug 2019 06:45  Phos  3.9     08-24  Mg     1.8     08-24         22 Aug 2019 12:00 Troponin x     / CK 63 u/L / CKMB 2.93 ng/mL / CKMB Units x                    RADIOLOGY & ADDITIONAL TESTS:    Jason Montenegro PGY2  Internal Medicine  Pager: TJ: [phone] NS: [phone] Patient is a 53y old  Female who presents with a chief complaint of Anemia and Sjogren's syndrome (24 Aug 2019 18:59)      SUBJECTIVE/OVERNIGHT EVENTS     No acute events overnight. Pt says her throat is a little sore from dryness. Eyes dry as well. Otherwise no complaints, no pain.  Denies nausea, vomiting, constipation, diarrhea, SOB, chest pain, fevers, chills.    MEDICATIONS  (STANDING):  hydroxychloroquine 200 milliGRAM(s) Oral daily    MEDICATIONS  (PRN):  artificial tears (preservative free) Ophthalmic Solution 1 Drop(s) Both EYES three times a day PRN Dry Eyes  cyclobenzaprine 5 milliGRAM(s) Oral three times a day PRN Muscle Spasm  ibuprofen  Tablet. 400 milliGRAM(s) Oral every 6 hours PRN Mild Pain (1 - 3), Moderate Pain (4 - 6)  oxyCODONE    IR 5 milliGRAM(s) Oral every 4 hours PRN Moderate Pain (4 - 6)  Saliva Substitute (CAPHOSOL) 30 milliLiter(s) Swish and Spit three times a day PRN Dry mouth    CAPILLARY BLOOD GLUCOSE        I&O's Summary        Vital Signs Last 24 Hrs  T(C): 36.6 (25 Aug 2019 05:24), Max: 36.8 (24 Aug 2019 15:20)  T(F): 97.9 (25 Aug 2019 05:24), Max: 98.2 (24 Aug 2019 15:20)  HR: 80 (25 Aug 2019 05:24) (80 - 100)  BP: 105/73 (25 Aug 2019 05:24) (105/73 - 121/86)  BP(mean): --  RR: 20 (25 Aug 2019 05:24) (19 - 20)  SpO2: 100% (25 Aug 2019 05:24) (100% - 100%)    PHYSICAL EXAM:  GENERAL: NAD, well-developed  HEAD:  Atraumatic, Normocephalic  EYES: PERRLA, conjunctiva and sclera clear  NECK: Supple, No JVD. palpable nontender submandibular nodes  CHEST/LUNG: Clear to auscultation bilaterally; No wheeze  HEART: Regular rate and rhythm; No murmurs, rubs, or gallops  ABDOMEN: Soft, Nontender, Nondistended; Bowel sounds present  EXTREMITIES:  No clubbing, cyanosis, or edema  PSYCH: AAOx3  NEUROLOGY: non-focal  SKIN: rashes on left arm. 4-5, 1 cm demarcated non pustular, non palpable white rash.    LABS                        9.1    4.74  )-----------( 153      ( 24 Aug 2019 06:45 )             31.1     08-24    138  |  103  |  12  ----------------------------<  86  3.7   |  27  |  0.56    Ca    9.0      24 Aug 2019 06:45  Phos  3.9     08-24  Mg     1.8     08-24         22 Aug 2019 12:00 Troponin x     / CK 63 u/L / CKMB 2.93 ng/mL / CKMB Units x                    RADIOLOGY & ADDITIONAL TESTS:    Jason Montenegro PGY2  Internal Medicine  Pager: LIUZAIR: [phone] NS: [phone]

## 2019-08-25 NOTE — PROGRESS NOTE ADULT - PROBLEM SELECTOR PLAN 3
resolved  - c/w flexeril 5mg po tid PRN for muscle spasm  - oxy 5 mgq4h PRN for severe pain  - motrin 400 mg q6h PRN for mild pain

## 2019-08-25 NOTE — PROGRESS NOTE ADULT - PROBLEM SELECTOR PLAN 1
-Rheum recs appreciated   -reviewed rsults for TODD, dsDNA, Sjogrens, strep panel, immunoglobulins and IgG subsets,  MMRV,   - f/u RF, CCP, urine microscopy, quantiferon,EBV  - r/o HTLV-1, HCV, HIV (all neg)  - started on Plaquenil 200mg qd,  - c/w artificial tears and saliva substitute   -Will consult heme regarding diffuse LAD -Rheum recs appreciated   -reviewed results for TODD, dsDNA, Sjogrens, strep panel, immunoglobulins and IgG subsets,  MMRV,   - f/u RF, CCP, urine microscopy, quantiferon,EBV  - r/o HTLV-1, HCV, HIV (all neg)  - started on Plaquenil 200mg qd,  - c/w artificial tears and saliva substitute   -Appreciate heme recs- will proceed with LN bx. Pt aware. -Rheum recs appreciated   -reviewed results for TODD, dsDNA, Sjogrens, strep panel, immunoglobulins and IgG subsets,  MMRV,   - f/u RF, CCP, urine microscopy, quantiferon,EBV  - r/o HTLV-1, HCV, HIV (all neg)  - started on Plaquenil 200mg qd,  - c/w artificial tears and saliva substitute   -Appreciate heme recs- will proceed with LN bx. Pt aware. Spoke with gen surg, would like IR to attempt core bx. Will speak w/ IR Mon.

## 2019-08-26 LAB
ANION GAP SERPL CALC-SCNC: 11 MMO/L — SIGNIFICANT CHANGE UP (ref 7–14)
APTT BLD: 33.1 SEC — SIGNIFICANT CHANGE UP (ref 27.5–36.3)
BLD GP AB SCN SERPL QL: NEGATIVE — SIGNIFICANT CHANGE UP
BUN SERPL-MCNC: 9 MG/DL — SIGNIFICANT CHANGE UP (ref 7–23)
CALCIUM SERPL-MCNC: 9.1 MG/DL — SIGNIFICANT CHANGE UP (ref 8.4–10.5)
CHLORIDE SERPL-SCNC: 104 MMOL/L — SIGNIFICANT CHANGE UP (ref 98–107)
CO2 SERPL-SCNC: 24 MMOL/L — SIGNIFICANT CHANGE UP (ref 22–31)
CREAT SERPL-MCNC: 0.57 MG/DL — SIGNIFICANT CHANGE UP (ref 0.5–1.3)
GLUCOSE SERPL-MCNC: 127 MG/DL — HIGH (ref 70–99)
HAPTOGLOB SERPL-MCNC: 208 MG/DL — HIGH (ref 34–200)
HCT VFR BLD CALC: 34 % — LOW (ref 34.5–45)
HGB BLD-MCNC: 10 G/DL — LOW (ref 11.5–15.5)
IGG1 SER-MCNC: 2097 MG/DL — HIGH (ref 248–810)
IGG2 SER-MCNC: 436 MG/DL — SIGNIFICANT CHANGE UP (ref 130–555)
IGG3 SER-MCNC: 190 MG/DL — HIGH (ref 15–102)
IGG4 SER-MCNC: 63 MG/DL — SIGNIFICANT CHANGE UP (ref 2–96)
INR BLD: 1.09 — SIGNIFICANT CHANGE UP (ref 0.88–1.17)
MCHC RBC-ENTMCNC: 23.5 PG — LOW (ref 27–34)
MCHC RBC-ENTMCNC: 29.4 % — LOW (ref 32–36)
MCV RBC AUTO: 79.8 FL — LOW (ref 80–100)
NRBC # FLD: 0.02 K/UL — SIGNIFICANT CHANGE UP (ref 0–0)
PLATELET # BLD AUTO: 197 K/UL — SIGNIFICANT CHANGE UP (ref 150–400)
PMV BLD: 10.1 FL — SIGNIFICANT CHANGE UP (ref 7–13)
POTASSIUM SERPL-MCNC: 3.8 MMOL/L — SIGNIFICANT CHANGE UP (ref 3.5–5.3)
POTASSIUM SERPL-SCNC: 3.8 MMOL/L — SIGNIFICANT CHANGE UP (ref 3.5–5.3)
PROTHROM AB SERPL-ACNC: 12.5 SEC — SIGNIFICANT CHANGE UP (ref 9.8–13.1)
RBC # BLD: 4.26 M/UL — SIGNIFICANT CHANGE UP (ref 3.8–5.2)
RBC # FLD: 15.8 % — HIGH (ref 10.3–14.5)
RH IG SCN BLD-IMP: POSITIVE — SIGNIFICANT CHANGE UP
SODIUM SERPL-SCNC: 139 MMOL/L — SIGNIFICANT CHANGE UP (ref 135–145)
TSH SERPL-MCNC: SIGNIFICANT CHANGE UP UIU/ML (ref 0.27–4.2)
WBC # BLD: 4.97 K/UL — SIGNIFICANT CHANGE UP (ref 3.8–10.5)
WBC # FLD AUTO: 4.97 K/UL — SIGNIFICANT CHANGE UP (ref 3.8–10.5)

## 2019-08-26 PROCEDURE — 99231 SBSQ HOSP IP/OBS SF/LOW 25: CPT | Mod: GC

## 2019-08-26 PROCEDURE — 99232 SBSQ HOSP IP/OBS MODERATE 35: CPT | Mod: GC

## 2019-08-26 RX ADMIN — Medication 30 MILLILITER(S): at 22:15

## 2019-08-26 RX ADMIN — Medication 30 MILLILITER(S): at 05:59

## 2019-08-26 RX ADMIN — Medication 1 DROP(S): at 22:16

## 2019-08-26 RX ADMIN — Medication 30 MILLILITER(S): at 15:43

## 2019-08-26 RX ADMIN — Medication 1 DROP(S): at 05:58

## 2019-08-26 RX ADMIN — Medication 1 DROP(S): at 15:43

## 2019-08-26 RX ADMIN — Medication 200 MILLIGRAM(S): at 12:19

## 2019-08-26 NOTE — PROGRESS NOTE ADULT - ASSESSMENT
53F F presents w SICCA symptoms, weight loss, lymphadenopathy, microcytic anemia, hypergammaglobulinemia  In the setting of positive TODD, SSA, RNP, barry  Likely patient findings secondary to Sjogren's While pt has positive serology for barry, RNP, pt does not clinically meet criteria for lupus or MCTD. Positive serology likely 2/2 to hypergammaglobulinemia   Given extensive lymphadenopathy lymphoma should be r/o. Agree w core or excisional lymph node bx.    Pt has G6PDH deficiency so plaquenil not an option.         Recommendations:  -proceed w core or excisional lymph node bx  -f/u CCP, RF   - Sialagogues (lozenges, lemon juice, eye drops), warm compresses, parotid gland massage  - Avoid anticholinergics  - d/c  Plaquenil 200mg PO daily    Mazin Tirado, PGY5  [id number] 53F F presents w SICCA symptoms, weight loss, lymphadenopathy, microcytic anemia, hypergammaglobulinemia  In the setting of positive TODD, SSA, RNP, barry  Likely patient findings secondary to Sjogren's While pt has positive serology for barry, RNP, pt does not clinically meet criteria for lupus or MCTD. Positive serology likely 2/2 to hypergammaglobulinemia   Given extensive lymphadenopathy lymphoma should be r/o. Agree w core or excisional lymph node bx.    Pt has G6PDH deficiency so plaquenil not an option.         Recommendations:  -proceed w core or excisional lymph node bx  -f/u CCP, RF   - Sialagogues (lozenges, lemon juice, eye drops), warm compresses, parotid gland massage  - Avoid anticholinergics  - d/c  Plaquenil 200mg PO daily as pt with G6PD deficiency. Will determine if needs systemic treatment for Sjogrens pending LN analysis.     Mazin Tirado, PGY5  [id number]

## 2019-08-26 NOTE — PROGRESS NOTE ADULT - PROBLEM SELECTOR PLAN 1
-Rheum recs appreciated   -reviewed results for TODD, dsDNA, Sjogrens, strep panel, immunoglobulins and IgG subsets,  MMRV,   - f/u RF, CCP, urine microscopy, quantiferon,EBV  - r/o HTLV-1, HCV, HIV (all neg)  - started on Plaquenil 200mg qd,  - c/w artificial tears and saliva substitute   -Appreciate heme recs- will proceed with LN bx. Pt aware. Spoke with gen surg, would like IR to attempt core bx. Will speak w/ IR Mon. -Rheum recs appreciated   -reviewed results for TODD, dsDNA, Sjogrens, strep panel, immunoglobulins and IgG subsets,  MMRV,   - f/u RF, CCP, urine microscopy, quantiferon,EBV  - r/o HTLV-1, HCV, HIV (all neg)  - started on Plaquenil 200mg qd,  - c/w artificial tears and saliva substitute   -Appreciate heme recs- will proceed with LN bx. Pt aware. Spoke with gen surg, would like IR to attempt core bx, scheduled for today

## 2019-08-26 NOTE — PROGRESS NOTE ADULT - ASSESSMENT
52 yo F w/ PMHx of anxiety, sialoadenitis/ sjrogrens syndrome p/w dizziness, palpitations, and sweating with an episode of CP, negative cardiac enzymes. Now with significant sialoadenitis being managed on Plaquenil. With significant LAD, pending LN bx for diagnosis.

## 2019-08-26 NOTE — PROGRESS NOTE ADULT - SUBJECTIVE AND OBJECTIVE BOX
JULISA BONITA  [id number]    INTERVAL HPI/OVERNIGHT EVENTS:  Pt feels well. Improved dry eyes and mouth w artifical tears and saliva. No fevers, chills, rashes.     MEDICATIONS  (STANDING):  artificial tears (preservative free) Ophthalmic Solution 1 Drop(s) Both EYES three times a day  Saliva Substitute (CAPHOSOL) 30 milliLiter(s) Swish and Spit three times a day    MEDICATIONS  (PRN):  cyclobenzaprine 5 milliGRAM(s) Oral three times a day PRN Muscle Spasm  ibuprofen  Tablet. 400 milliGRAM(s) Oral every 6 hours PRN Mild Pain (1 - 3), Moderate Pain (4 - 6)      Allergies    penicillin (Rash)    Intolerances        Review of Systems:   per HPI otherwise negative     Vital Signs Last 24 Hrs  T(C): 36.8 (26 Aug 2019 13:18), Max: 37.1 (26 Aug 2019 05:54)  T(F): 98.2 (26 Aug 2019 13:18), Max: 98.8 (26 Aug 2019 05:54)  HR: 99 (26 Aug 2019 13:18) (80 - 99)  BP: 119/83 (26 Aug 2019 13:18) (113/78 - 119/83)  BP(mean): --  RR: 18 (26 Aug 2019 13:18) (18 - 19)  SpO2: 100% (26 Aug 2019 13:18) (100% - 100%)    Physical Exam:  General: NAD, thin woman  HEENT: slow salivary pooling. No enlarged salivary gland. Enlarged parotid glands w lymphadenopathy   Cardio: +S1/S2, RRR  Resp: CTA b/l  GI: +BS, soft, NT/ND  MSK: no joint synovitis on exam   Neuro: AAOx3  Psych: wnl    LABS:                        10.0   4.97  )-----------( 197      ( 26 Aug 2019 09:45 )             34.0     08-    139  |  104  |  9   ----------------------------<  127<H>  3.8   |  24  |  0.57    Ca    9.1      26 Aug 2019 09:45      PT/INR - ( 26 Aug 2019 09:45 )   PT: 12.5 SEC;   INR: 1.09          PTT - ( 26 Aug 2019 09:45 )  PTT:33.1 SEC    Sjogren&#x27;s Syndrome Antibodies (19 @ 18:15)    Anti SS-A Antibody: >8.0: INFCE Result Units: [id number]    Anti SS-B Antibody: <0.2: INFCE Result Units: [id number]  Fluorescent Bead Immunoassay  Reference Ranges for SS-A AND SS-B:  <1.0 AI (negative)  > or =1.0 AI (positive)  Reference values apply  to all ages.    ROBINA Antibody Screening Test (19 @ 05:49)    ROBINA - Smith Antibody: > 8.0: INFCE Result Units: [id number]    ROBINA - RNP Antibody: > 8.0: INFCE Result Units: [id number]  Fluorescent Bead Immunoassy  Reference Ranges for RNP and SM:  <1.0 AI (negative)  > or =1.0 AI (positive)  Reference values apply to all ages    Immunoglobulin, Serum (19 @ 05:49)    Quantitative IgA: 589 mg/dL    Quantitative Ig mg/dL    Quantitative IgM: 107 mg/dL    C3 Complement, Serum (19 @ 18:18)    C3 Complement, Serum: 60.1 mg/dL    C4 Complement, Serum (19 @ 18:18)    C4 Complement, Serum: 20.2 mg/dL      IgG Subsets (19 @ 05:49)    IgG 1: 2097 mg/dL    IgG 2: 436 mg/dL    IgG 3: 190 mg/dL    IgG 4: 63: Performed At:  [facility] [place] [id number] [place], [place] [id number]  Jasper Cifuentes MD Ph:[phone]  Performed At:  [facility]  [address] [place], [place] [id number]  Reyes Araceli B MD Ph:[phone] mg/dL    Glucose-6-Phosphate Dehydrogenase (19 @ 05:49)    Glucose-6-Phosphate Dehydrogenase: 2.8: INFCE Result Units: [id number]  Test Performed by [facility],  [facility],  [address] [place] [address]   Ángel Coreas M.D., Ph.D., Director of Laboratories  [phone], CLIA [id number]        RADIOLOGY & ADDITIONAL TESTS:

## 2019-08-26 NOTE — PROGRESS NOTE ADULT - PROBLEM SELECTOR PLAN 2
Given her autoimmune history  -TTE 8/24 mostly normal: REF 53% Rt mobile structure suggestive of chiari network (normal anatomic variant), mild diatstolic dyfx LV noted; valves normal

## 2019-08-26 NOTE — PROGRESS NOTE ADULT - SUBJECTIVE AND OBJECTIVE BOX
Patient is a 53y old  Female who presents with a chief complaint of Anemia and Sjogren's syndrome (24 Aug 2019 18:59)      SUBJECTIVE/OVERNIGHT EVENTS     No acute events overnight. Pt says her throat is a little sore from dryness. Eyes dry as well. Otherwise no complaints, no pain.  Denies nausea, vomiting, constipation, diarrhea, SOB, chest pain, fevers, chills.    MEDICATIONS  (STANDING):  artificial tears (preservative free) Ophthalmic Solution 1 Drop(s) Both EYES three times a day  hydroxychloroquine 200 milliGRAM(s) Oral daily  Saliva Substitute (CAPHOSOL) 30 milliLiter(s) Swish and Spit three times a day    MEDICATIONS  (PRN):  cyclobenzaprine 5 milliGRAM(s) Oral three times a day PRN Muscle Spasm  ibuprofen  Tablet. 400 milliGRAM(s) Oral every 6 hours PRN Mild Pain (1 - 3), Moderate Pain (4 - 6)    Allergies    penicillin (Rash)    Intolerances    VITAL SIGNS:  ICU Vital Signs Last 24 Hrs  T(C): 37.1 (26 Aug 2019 05:54), Max: 37.1 (26 Aug 2019 05:54)  T(F): 98.8 (26 Aug 2019 05:54), Max: 98.8 (26 Aug 2019 05:54)  HR: 80 (26 Aug 2019 05:54) (80 - 99)  BP: 116/76 (26 Aug 2019 05:54) (112/71 - 116/76)  BP(mean): --  ABP: --  ABP(mean): --  RR: 18 (26 Aug 2019 05:54) (18 - 20)  SpO2: 100% (26 Aug 2019 05:54) (98% - 100%)      CAPILLARY BLOOD GLUCOSE          Daily       PHYSICAL EXAM:  GENERAL: NAD, well-developed  HEAD:  Atraumatic, Normocephalic  EYES: PERRLA, conjunctiva and sclera clear  NECK: Supple, No JVD. palpable nontender submandibular nodes  CHEST/LUNG: Clear to auscultation bilaterally; No wheeze  HEART: Regular rate and rhythm; No murmurs, rubs, or gallops  ABDOMEN: Soft, Nontender, Nondistended; Bowel sounds present  EXTREMITIES:  No clubbing, cyanosis, or edema  PSYCH: AAOx3  NEUROLOGY: non-focal  SKIN: rashes on left arm. 4-5, 1 cm demarcated non pustular, non palpable white rash.    LABS:                        9.1    4.74  )-----------( 153      ( 24 Aug 2019 06:45 )             31.1           08-24    138  |  103  |  12  ----------------------------<  86  3.7   |  27  |  0.56    Ca    9.0      24 Aug 2019 06:45          CAPILLARY BLOOD GLUCOSE                IMAGING: Radiology personally reviewed Talia Max MD, S  Internal Medicine PGY1  Pager: [phone]    Patient is a 53y old  Female who presents with a chief complaint of Anemia and Sjogren's syndrome (24 Aug 2019 18:59)    SUBJECTIVE/OVERNIGHT EVENTS     No acute events overnight. Pt says her throat is a little sore from dryness. Eyes dry as well. Otherwise no complaints, no pain. Denies nausea, vomiting, constipation, diarrhea, SOB, chest pain, fevers, chills.    MEDICATIONS  (STANDING):  artificial tears (preservative free) Ophthalmic Solution 1 Drop(s) Both EYES three times a day  hydroxychloroquine 200 milliGRAM(s) Oral daily  Saliva Substitute (CAPHOSOL) 30 milliLiter(s) Swish and Spit three times a day    MEDICATIONS  (PRN):  cyclobenzaprine 5 milliGRAM(s) Oral three times a day PRN Muscle Spasm  ibuprofen  Tablet. 400 milliGRAM(s) Oral every 6 hours PRN Mild Pain (1 - 3), Moderate Pain (4 - 6)    Allergies    penicillin (Rash)    Intolerances    VITAL SIGNS:  ICU Vital Signs Last 24 Hrs  T(C): 37.1 (26 Aug 2019 05:54), Max: 37.1 (26 Aug 2019 05:54)  T(F): 98.8 (26 Aug 2019 05:54), Max: 98.8 (26 Aug 2019 05:54)  HR: 80 (26 Aug 2019 05:54) (80 - 99)  BP: 116/76 (26 Aug 2019 05:54) (112/71 - 116/76)  BP(mean): --  ABP: --  ABP(mean): --  RR: 18 (26 Aug 2019 05:54) (18 - 20)  SpO2: 100% (26 Aug 2019 05:54) (98% - 100%)      CAPILLARY BLOOD GLUCOSE          Daily       PHYSICAL EXAM:  GENERAL: NAD   EYES:  conjunctiva and sclera clear  NECK: Supple, No JVD. Palpable nontender submandibular nodes  CHEST/LUNG: Clear to auscultation bilaterally; No wheeze  HEART: Regular rate and rhythm; No murmurs, rubs, or gallops  ABDOMEN: Soft, Nontender, Nondistended; Bowel sounds present  EXTREMITIES:  No clubbing, cyanosis, or edema  PSYCH: AAOx3, depressed affect  NEUROLOGY: non-focal    LABS:                        10.0   4.97  )-----------( 197      ( 26 Aug 2019 09:45 )             34.0                         9.1    4.74  )-----------( 153      ( 24 Aug 2019 06:45 )             31.1     PT/INR - ( 26 Aug 2019 09:45 )   PT: 12.5 SEC;   INR: 1.09          PTT - ( 26 Aug 2019 09:45 )  PTT:33.1 SEC      08-26    139  |  104  |  9   ----------------------------<  127<H>  3.8   |  24  |  0.57  08-24    138  |  103  |  12  ----------------------------<  86  3.7   |  27  |  0.56    Ca    9.1      26 Aug 2019 09:45  Ca    9.0      24 Aug 2019 06:45          CAPILLARY BLOOD GLUCOSE                IMAGING: Radiology personally reviewed

## 2019-08-26 NOTE — CHART NOTE - NSCHARTNOTEFT_GEN_A_CORE
Pre-Interventional Radiology Procedure Note    JULISA MESA | [id number]    08-26-19 @ 12:50    Interventional Radiology Attending Physician: Dr. Alvarado    Ordering Attending Physician: Jorge Huber    Diagnosis/Indication: Patient is a 53y old  Female who presents with a chief complaint of Anemia and Sjogren's syndrome (26 Aug 2019 07:50)      Procedure: U/S guided core biopsy by IR    53y    Female    PAST MEDICAL & SURGICAL HISTORY:  No pertinent past medical history  No significant past surgical history       CBC Full  -  ( 26 Aug 2019 09:45 )  WBC Count : 4.97 K/uL  RBC Count : 4.26 M/uL  Hemoglobin : 10.0 g/dL  Hematocrit : 34.0 %  Platelet Count - Automated : 197 K/uL  Mean Cell Volume : 79.8 fL  Mean Cell Hemoglobin : 23.5 pg  Mean Cell Hemoglobin Concentration : 29.4 %  Auto Neutrophil # : x  Auto Lymphocyte # : x  Auto Monocyte # : x  Auto Eosinophil # : x  Auto Basophil # : x  Auto Neutrophil % : x  Auto Lymphocyte % : x  Auto Monocyte % : x  Auto Eosinophil % : x  Auto Basophil % : x    08-26    139  |  104  |  9   ----------------------------<  127<H>  3.8   |  24  |  0.57    Ca    9.1      26 Aug 2019 09:45      PT/INR - ( 26 Aug 2019 09:45 )   PT: 12.5 SEC;   INR: 1.09          PTT - ( 26 Aug 2019 09:45 )  PTT:33.1 SEC

## 2019-08-27 ENCOUNTER — TRANSCRIPTION ENCOUNTER (OUTPATIENT)
Age: 54
End: 2019-08-27

## 2019-08-27 ENCOUNTER — RESULT REVIEW (OUTPATIENT)
Age: 54
End: 2019-08-27

## 2019-08-27 VITALS
OXYGEN SATURATION: 100 % | TEMPERATURE: 98 F | RESPIRATION RATE: 16 BRPM | HEART RATE: 92 BPM | SYSTOLIC BLOOD PRESSURE: 118 MMHG | DIASTOLIC BLOOD PRESSURE: 77 MMHG

## 2019-08-27 PROBLEM — Z00.00 ENCOUNTER FOR PREVENTIVE HEALTH EXAMINATION: Status: ACTIVE | Noted: 2019-08-27

## 2019-08-27 PROBLEM — Z78.9 OTHER SPECIFIED HEALTH STATUS: Chronic | Status: ACTIVE | Noted: 2019-08-20

## 2019-08-27 LAB
ANION GAP SERPL CALC-SCNC: 10 MMO/L — SIGNIFICANT CHANGE UP (ref 7–14)
APTT BLD: 35.1 SEC — SIGNIFICANT CHANGE UP (ref 27.5–36.3)
BUN SERPL-MCNC: 9 MG/DL — SIGNIFICANT CHANGE UP (ref 7–23)
CALCIUM SERPL-MCNC: 9 MG/DL — SIGNIFICANT CHANGE UP (ref 8.4–10.5)
CCP AB SER-ACNC: <8 — SIGNIFICANT CHANGE UP
CHLORIDE SERPL-SCNC: 104 MMOL/L — SIGNIFICANT CHANGE UP (ref 98–107)
CO2 SERPL-SCNC: 26 MMOL/L — SIGNIFICANT CHANGE UP (ref 22–31)
CREAT SERPL-MCNC: 0.52 MG/DL — SIGNIFICANT CHANGE UP (ref 0.5–1.3)
EBV DNA SERPL NAA+PROBE-ACNC: SIGNIFICANT CHANGE UP
GAMMA INTERFERON BACKGROUND BLD IA-ACNC: 0.03 IU/ML — SIGNIFICANT CHANGE UP
GLUCOSE SERPL-MCNC: 158 MG/DL — HIGH (ref 70–99)
HCT VFR BLD CALC: 33.8 % — LOW (ref 34.5–45)
HGB BLD-MCNC: 10 G/DL — LOW (ref 11.5–15.5)
INR BLD: 1.07 — SIGNIFICANT CHANGE UP (ref 0.88–1.17)
M TB IFN-G BLD-IMP: SIGNIFICANT CHANGE UP
M TB IFN-G CD4+ BCKGRND COR BLD-ACNC: -0.01 IU/ML — SIGNIFICANT CHANGE UP
M TB IFN-G CD4+CD8+ BCKGRND COR BLD-ACNC: -0.02 IU/ML — SIGNIFICANT CHANGE UP
MAGNESIUM SERPL-MCNC: 1.9 MG/DL — SIGNIFICANT CHANGE UP (ref 1.6–2.6)
MCHC RBC-ENTMCNC: 23.5 PG — LOW (ref 27–34)
MCHC RBC-ENTMCNC: 29.6 % — LOW (ref 32–36)
MCV RBC AUTO: 79.3 FL — LOW (ref 80–100)
NRBC # FLD: 0 K/UL — SIGNIFICANT CHANGE UP (ref 0–0)
PLATELET # BLD AUTO: 194 K/UL — SIGNIFICANT CHANGE UP (ref 150–400)
PMV BLD: 9.5 FL — SIGNIFICANT CHANGE UP (ref 7–13)
POTASSIUM SERPL-MCNC: 3.4 MMOL/L — LOW (ref 3.5–5.3)
POTASSIUM SERPL-SCNC: 3.4 MMOL/L — LOW (ref 3.5–5.3)
PROTHROM AB SERPL-ACNC: 12.2 SEC — SIGNIFICANT CHANGE UP (ref 9.8–13.1)
QUANT TB PLUS MITOGEN MINUS NIL: 0 IU/ML — SIGNIFICANT CHANGE UP
RBC # BLD: 4.26 M/UL — SIGNIFICANT CHANGE UP (ref 3.8–5.2)
RBC # FLD: 16 % — HIGH (ref 10.3–14.5)
RHEUMATOID FACT SERPL-ACNC: 12 IU/ML — SIGNIFICANT CHANGE UP
SODIUM SERPL-SCNC: 140 MMOL/L — SIGNIFICANT CHANGE UP (ref 135–145)
WBC # BLD: 5.09 K/UL — SIGNIFICANT CHANGE UP (ref 3.8–10.5)
WBC # FLD AUTO: 5.09 K/UL — SIGNIFICANT CHANGE UP (ref 3.8–10.5)

## 2019-08-27 PROCEDURE — 88188 FLOWCYTOMETRY/READ 9-15: CPT

## 2019-08-27 PROCEDURE — 10005 FNA BX W/US GDN 1ST LES: CPT

## 2019-08-27 PROCEDURE — 88173 CYTOPATH EVAL FNA REPORT: CPT | Mod: 26

## 2019-08-27 PROCEDURE — 99239 HOSP IP/OBS DSCHRG MGMT >30: CPT | Mod: GC

## 2019-08-27 RX ORDER — CYCLOBENZAPRINE HYDROCHLORIDE 10 MG/1
1 TABLET, FILM COATED ORAL
Qty: 42 | Refills: 0
Start: 2019-08-27 | End: 2019-09-09

## 2019-08-27 RX ORDER — SALIVA SUBSTITUTE COMB NO.11 351 MG
30 POWDER IN PACKET (EA) MUCOUS MEMBRANE
Qty: 30 | Refills: 0
Start: 2019-08-27

## 2019-08-27 RX ORDER — SALIVA SUBSTITUTE COMB NO.11 351 MG
30 POWDER IN PACKET (EA) MUCOUS MEMBRANE
Qty: 30 | Refills: 3
Start: 2019-08-27 | End: 2019-10-21

## 2019-08-27 RX ORDER — POTASSIUM CHLORIDE 20 MEQ
20 PACKET (EA) ORAL ONCE
Refills: 0 | Status: COMPLETED | OUTPATIENT
Start: 2019-08-27 | End: 2019-08-27

## 2019-08-27 RX ADMIN — Medication 1 DROP(S): at 14:29

## 2019-08-27 RX ADMIN — Medication 20 MILLIEQUIVALENT(S): at 14:29

## 2019-08-27 RX ADMIN — Medication 1 DROP(S): at 05:59

## 2019-08-27 RX ADMIN — Medication 30 MILLILITER(S): at 14:30

## 2019-08-27 RX ADMIN — Medication 30 MILLILITER(S): at 05:58

## 2019-08-27 NOTE — PROGRESS NOTE ADULT - SUBJECTIVE AND OBJECTIVE BOX
Interventional Radiology Brief Post-Procedure Note    Procedure: Right axillary lymph node biopsy.    Operators: Mazin Garzon MD    Anesthesia (type): Local lidocaine.    Contrast: None.    EBL: None.     Findings/Follow up Plan of Care: Successful FNA of morphologically abnormal right axillary lymph node. Core biopsy not obtained due to hypermobility of the lymph node.     Specimens Removed: Four 22 gauge lymph node FNA.     Implants: None.     Complications: No immediate complications.     Condition/Disposition: Back to inpatient room.     Please call Interventional Radiology x [phone] with any questions, concerns, or issues.

## 2019-08-27 NOTE — PROGRESS NOTE ADULT - ATTENDING COMMENTS
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
Patient s/p LN Bx. Stable. For follow up with rheum and heme (if only 1 visit prefer rheum).  Plaquenil d/c in setting of G6PDD.  D/c planning 40 minutes with outpatient follow up with rheum. Patient to be in [place] for 2 months prior to returning to [place] and if feasible would like to see care here.
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
Patient awaiting core biopsy from IR.  Therafter can be discharged. Discussed with rheumatology, likely lupus as primary diagnosis.  Patient clinically improved.  Will need outpatient rheumatology apopintment.  D/C pendign LN Bx (less likely lymphoma, more likely autoimmune phenomena).   -D/C planning 35 minutes. today or tomorrow.
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

## 2019-08-27 NOTE — DISCHARGE NOTE NURSING/CASE MANAGEMENT/SOCIAL WORK - NSDCPNINST_GEN_ALL_CORE
Ms Álvarez Is stable for discharge, she is alert , independent with her care; she had the Lymph Node Biopsy done this morning and has a Band Aide in place .  Skin is intact, Vital Signs are stable and she tolerated her diet well.  Discharge Instructions are discussed with her and given

## 2019-08-27 NOTE — PROGRESS NOTE ADULT - PROBLEM SELECTOR PLAN 1
-Rheum recs appreciated   -reviewed results for TODD, dsDNA, Sjogrens, strep panel, immunoglobulins and IgG subsets,  MMRV,   - r/o HTLV-1, HCV, HIV (all neg)  - started on Plaquenil 200mg qd. dc'ed due to positive for G6PD  - c/w artificial tears and saliva substitute   -Appreciate heme recs- will proceed with LN bx. s/p LN bx today

## 2019-08-27 NOTE — PROGRESS NOTE ADULT - SUBJECTIVE AND OBJECTIVE BOX
Cassidy Valdivia ([place]) PGY-1  Pager: NS) [phone]/ [phone]    Patient is a 53y old  Female who presents with a chief complaint of Anemia and Sjogren's syndrome (27 Aug 2019 09:40)      SUBJECTIVE / OVERNIGHT EVENTS:  No acute complaints over night. Denies any fevers/chills, headache, CP, SOB, abd pain, N/V/D, constipation, or leg swelling.       MEDICATIONS  (STANDING):  artificial tears (preservative free) Ophthalmic Solution 1 Drop(s) Both EYES three times a day  Saliva Substitute (CAPHOSOL) 30 milliLiter(s) Swish and Spit three times a day    MEDICATIONS  (PRN):  cyclobenzaprine 5 milliGRAM(s) Oral three times a day PRN Muscle Spasm  ibuprofen  Tablet. 400 milliGRAM(s) Oral every 6 hours PRN Mild Pain (1 - 3), Moderate Pain (4 - 6)          OBJECTIVE:  Vital Signs Last 24 Hrs  T(C): 36.7 (27 Aug 2019 14:28), Max: 37 (26 Aug 2019 21:34)  T(F): 98 (27 Aug 2019 14:28), Max: 98.6 (26 Aug 2019 21:34)  HR: 92 (27 Aug 2019 14:28) (80 - 92)  BP: 118/77 (27 Aug 2019 14:28) (100/68 - 136/80)  BP(mean): --  RR: 16 (27 Aug 2019 14:28) (16 - 18)  SpO2: 100% (27 Aug 2019 14:28) (100% - 100%)    PHYSICAL EXAM:  GENERAL: NAD   EYES:  conjunctiva and sclera clear  NECK: Supple, No JVD. Palpable nontender submandibular nodes b/l  CHEST/LUNG: Clear to auscultation bilaterally; No wheeze, rhonchi, crackles  HEART: Regular rate and rhythm; No murmurs, rubs, or gallops  ABDOMEN: Soft, Nontender, Nondistended; Bowel sounds present  EXTREMITIES:  No clubbing, cyanosis, or edema  PSYCH: AAOx3      CAPILLARY BLOOD GLUCOSE        I&O's Summary    26 Aug 2019 07:01  -  27 Aug 2019 07:00  --------------------------------------------------------  IN: 550 mL / OUT: 0 mL / NET: 550 mL              LABS:                        10.0   5.09  )-----------( 194      ( 27 Aug 2019 12:00 )             33.8     08-27    140  |  104  |  9   ----------------------------<  158<H>  3.4<L>   |  26  |  0.52    Ca    9.0      27 Aug 2019 12:00  Mg     1.9     08-27      PT/INR - ( 27 Aug 2019 12:00 )   PT: 12.2 SEC;   INR: 1.07          PTT - ( 27 Aug 2019 12:00 )  PTT:35.1 SEC            RADIOLOGY & ADDITIONAL TESTS:

## 2019-08-27 NOTE — PROGRESS NOTE ADULT - SUBJECTIVE AND OBJECTIVE BOX
Vascular & Interventional Radiology Pre-Procedure Note    Procedure Name: Right axillary lymph node biopsy.    HPI: 53y Female with incidentally noted right axillary lymph nodes which are mildly enlarged.    Allergies: penicillin (Rash)    Medications (Abx/Cardiac/Anticoagulation/Blood Products)    hydroxychloroquine: 200 milliGRAM(s) Oral (08-26 @ 12:19)    Data:    T(C): 37  HR: 88  BP: 100/68  RR: 17  SpO2: 100%    Exam  General: _______  Chest: _______  Abdomen: _______  Extremities: _______    -WBC 4.97 / HgB 10.0 / Hct 34.0 / Plt 197  -Na 139 / Cl 104 / BUN 9 / Glucose 127  -K 3.8 / CO2 24 / Cr 0.57  -ALT -- / Alk Phos -- / T.Bili --  -INR1.09    Imaging: CT from 8/22/2019    Plan:   -53y Female presents for right axillary core liver biopsy.   -Risks/Benefits/alternatives explained with the patient and/or healthcare proxy and witnessed informed consent obtained.

## 2019-08-27 NOTE — PROGRESS NOTE ADULT - ASSESSMENT
52 yo F w/ PMHx of anxiety, sialoadenitis/ sjrogrens syndrome p/w dizziness, palpitations, and sweating with an episode of CP labs consistent with sjrogrens/ lupus with significant cervical and axillary LAD. s/p LN bx to r/o lymphoma.

## 2019-08-27 NOTE — PROGRESS NOTE ADULT - REASON FOR ADMISSION
Anemia and Sjogren's syndrome

## 2019-08-27 NOTE — DISCHARGE NOTE NURSING/CASE MANAGEMENT/SOCIAL WORK - PATIENT PORTAL LINK FT
You can access the FollowMyHealth Patient Portal offered by [facility] by registering at the following website: [email or web address]. By joining [facility]’s FollowMyHealth portal, you will also be able to view your health information using other applications (apps) compatible with our system.

## 2019-08-27 NOTE — PROGRESS NOTE ADULT - PROBLEM SELECTOR PLAN 7
DVT PPx: IMPROVE score 0, no ppx  Diet: regular  Dispo: Home
DVT PPx: IMPROVE score 0, no ppx  Diet: regular  Dispo: pending workup

## 2019-08-28 LAB
VZV IGG SER IF-ACNC: SIGNIFICANT CHANGE UP
VZV IGG SER IF-ACNC: SIGNIFICANT CHANGE UP

## 2019-09-09 ENCOUNTER — APPOINTMENT (OUTPATIENT)
Dept: RHEUMATOLOGY | Facility: CLINIC | Age: 54
End: 2019-09-09

## 2019-09-11 ENCOUNTER — INBOUND DOCUMENT (OUTPATIENT)
Age: 54
End: 2019-09-11

## 2020-07-14 NOTE — PATIENT PROFILE ADULT - NSPROPTRIGHTBILLOFRIGHTS_GEN_A_NUR
Assessment/Plan:  PC seen in apartment    Dermatitis associated with moisture  · Cont protective ointment  · Keep clean and dry    Pressure injury of sacral region, stage 2 (HCC)  · Improvement, healed  · Still erythremic, no ss infection  · Dc iodosorb, change to skin prep barrier bid  · Off load pressure, keep dry       Diagnoses and all orders for this visit:    Dermatitis associated with moisture    Pressure injury of sacral region, stage 2 (HCC)        Subjective:      Patient ID: Steve Rowe is a 79 y o  female  Mrs Aria Olivas seen for follow up of sacral wound  She states she is doing well  Tailbone feels better  She was given new cream from nursing  No longer has dressing on  Pt denies cp/sob/cough  Denies gi/gu distress  Good sleeping  Trying to move and change position frequently  No fever/chills  The following portions of the patient's history were reviewed and updated as appropriate: past family history, past medical history, past social history and past surgical history  Review of Systems   Constitutional: Negative  Respiratory: Negative for cough and shortness of breath  Cardiovascular: Negative for chest pain  Musculoskeletal: Positive for gait problem  Skin: Positive for wound (buttock)  Objective:      /67   Pulse 75   Temp (!) 96 9 °F (36 1 °C)   Resp 18   Wt 44 9 kg (99 lb)   SpO2 94%   BMI 16 99 kg/m²          Physical Exam   Constitutional: She appears well-developed and well-nourished  No distress  thin   Neurological: She is alert  oriented to self, mostly tps  Forgetful, engages in convo, makes needs known   Skin: Skin is warm and dry  She is not diaphoretic  Sacrum remains reddened but open area is now closed   Psychiatric: She has a normal mood and affect  Her behavior is normal    Nursing note and vitals reviewed          Meds reviewed in [facility] and updated in Epic    Current Outpatient Medications:     mirtazapine (REMERON) 15 mg tablet, Take 7 5 mg by mouth daily at bedtime , Disp: , Rfl:     acetaminophen (TYLENOL) 325 mg tablet, Take two tablets by mouth every 6 hours as needed, Disp: , Rfl:     calcium carbonate (OS-CHLOÉ) 600 MG tablet, Take 600 mg by mouth daily, Disp: , Rfl:     denosumab (PROLIA) 60 mg/mL, Inject under the skin every 6 (six) months, Disp: , Rfl:     ferrous sulfate 325 (65 Fe) mg tablet, Take 325 mg by mouth daily at bedtime, Disp: , Rfl:     folic acid (FOLVITE) 796 mcg tablet, Take 400 mcg by mouth daily, Disp: , Rfl:     gabapentin (NEURONTIN) 100 mg capsule, Take 100 mg by mouth daily at bedtime, Disp: , Rfl:     levothyroxine 50 mcg tablet, Take 50 mcg by mouth daily To take 1 p o  daily on an empty stomach, Disp: , Rfl:     Multiple Vitamin (DAILY JAMES) TABS, Take by mouth daily, Disp: , Rfl:     Nutritional Supplements (ENSURE ACTIVE) LIQD, Take by mouth daily, Disp: , Rfl:     phenylephrine (JUNIOR-SYNEPHRINE) 1 % nasal spray, Administer as needed, Disp: , Rfl:     potassium chloride (MICRO-K) 10 MEQ CR capsule, Take 10 mEq by mouth 2 (two) times a day, Disp: , Rfl:     rOPINIRole (REQUIP) 2 mg tablet, Take by mouth, Disp: , Rfl: patient

## 2022-05-15 NOTE — ED CDU PROVIDER DISPOSITION NOTE - CADM POA CENTRAL LINE
Low dose Heparin  Anti-Xa at 0900 check is therapeutic at 0.34IU/mL. No changes. Continue at 29.5mL/hr and recheck anti-Xa at 1500.   MATTHEW Gutierrez [facility] PharmD 5/15/2022 10:38 AM No

## 2022-08-12 NOTE — ED PROVIDER NOTE - PHYSICAL EXAMINATION
HEENT/Neck: + posterior pharynx w/ exudate/erythema, No uvular deviation, no tonsillar exudate. +L anterior cervical lymphadenopathy. family

## 2024-08-28 NOTE — PROGRESS NOTE ADULT - SUBJECTIVE AND OBJECTIVE BOX
Hx of severe COPD  Patient feels symptoms have been gradually progressive  Follows pulmonology Dr. iNcholson outpatient  Hospitalized for acute exacerbation  Treated inpatient including with breathing treatments/nebs, steroid taper, azithromycin with improvement  Continue breathing regimen  Presently on supplemental O2 2L. Not on supplemental O2 at baseline. Wean as appropriate, maintain O2 sat >= 88. Note that pulse ox readings challenging in her case due to her Raynauds. If needed, consider oximetry study to qualify for home O2.  Respiratory consulted to follow at rehab  Follow up with PCP, Pulmonology   Cassidy Valdivia ([place]) PGY-1  Pager: [phone]     Patient is a 53y old  Female who presents with a chief complaint of Anemia and Sjogren's syndrome (23 Aug 2019 12:50)      SUBJECTIVE / OVERNIGHT EVENTS:  No acute complaints over night. Denies any fevers/chills, headache, CP, SOB, abd pain, N/V/D, constipation, or leg swelling.       MEDICATIONS  (STANDING):  hydroxychloroquine 200 milliGRAM(s) Oral daily    MEDICATIONS  (PRN):  artificial tears (preservative free) Ophthalmic Solution 1 Drop(s) Both EYES three times a day PRN Dry Eyes  cyclobenzaprine 5 milliGRAM(s) Oral three times a day PRN Muscle Spasm  ibuprofen  Tablet. 400 milliGRAM(s) Oral every 6 hours PRN Mild Pain (1 - 3), Moderate Pain (4 - 6)  oxyCODONE    IR 5 milliGRAM(s) Oral every 4 hours PRN Moderate Pain (4 - 6)  Saliva Substitute (CAPHOSOL) 30 milliLiter(s) Swish and Spit three times a day PRN Dry mouth          OBJECTIVE:  Vital Signs Last 24 Hrs  T(C): 36.8 (24 Aug 2019 05:18), Max: 37 (23 Aug 2019 21:25)  T(F): 98.3 (24 Aug 2019 05:18), Max: 98.6 (23 Aug 2019 21:25)  HR: 91 (24 Aug 2019 05:18) (90 - 93)  BP: 114/80 (24 Aug 2019 05:18) (114/80 - 135/72)  BP(mean): --  RR: 20 (24 Aug 2019 05:18) (18 - 20)  SpO2: 100% (24 Aug 2019 05:18) (100% - 100%)    PHYSICAL EXAM:  GENERAL: NAD, well-developed  HEAD:  Atraumatic, Normocephalic  EYES: PERRLA, conjunctiva and sclera clear  NECK: Supple, No JVD. palpable nontender submandibular nodes  CHEST/LUNG: Clear to auscultation bilaterally; No wheeze  HEART: Regular rate and rhythm; No murmurs, rubs, or gallops  ABDOMEN: Soft, Nontender, Nondistended; Bowel sounds present  EXTREMITIES:  No clubbing, cyanosis, or edema  PSYCH: AAOx3  NEUROLOGY: non-focal  SKIN: rashes on left arm. 4-5, 1 cm demarcated non pustular, non palpable white rash.    CAPILLARY BLOOD GLUCOSE        I&O's Summary    23 Aug 2019 07:01  -  24 Aug 2019 07:00  --------------------------------------------------------  IN: 550 mL / OUT: 0 mL / NET: 550 mL              LABS:                        9.1    4.74  )-----------( 153      ( 24 Aug 2019 06:45 )             31.1     08-24    138  |  103  |  12  ----------------------------<  86  3.7   |  27  |  0.56    Ca    9.0      24 Aug 2019 06:45  Phos  3.9     08-24  Mg     1.8     08-24        CARDIAC MARKERS ( 22 Aug 2019 12:00 )  x     / x     / 63 u/L / 2.93 ng/mL / x                RADIOLOGY & ADDITIONAL TESTS: Cassidy Valdivia ([place]) PGY-1  Pager: [phone]     Patient is a 53y old  Female who presents with a chief complaint of Anemia and Sjogren's syndrome (23 Aug 2019 12:50)      SUBJECTIVE / OVERNIGHT EVENTS:  No acute complaints over night. Denies any fevers/chills, headache, CP, SOB, abd pain, N/V/D, constipation, or leg swelling.   denies episodes of palpitations, dizziness      MEDICATIONS  (STANDING):  hydroxychloroquine 200 milliGRAM(s) Oral daily    MEDICATIONS  (PRN):  artificial tears (preservative free) Ophthalmic Solution 1 Drop(s) Both EYES three times a day PRN Dry Eyes  cyclobenzaprine 5 milliGRAM(s) Oral three times a day PRN Muscle Spasm  ibuprofen  Tablet. 400 milliGRAM(s) Oral every 6 hours PRN Mild Pain (1 - 3), Moderate Pain (4 - 6)  oxyCODONE    IR 5 milliGRAM(s) Oral every 4 hours PRN Moderate Pain (4 - 6)  Saliva Substitute (CAPHOSOL) 30 milliLiter(s) Swish and Spit three times a day PRN Dry mouth          OBJECTIVE:  Vital Signs Last 24 Hrs  T(C): 36.8 (24 Aug 2019 05:18), Max: 37 (23 Aug 2019 21:25)  T(F): 98.3 (24 Aug 2019 05:18), Max: 98.6 (23 Aug 2019 21:25)  HR: 91 (24 Aug 2019 05:18) (90 - 93)  BP: 114/80 (24 Aug 2019 05:18) (114/80 - 135/72)  BP(mean): --  RR: 20 (24 Aug 2019 05:18) (18 - 20)  SpO2: 100% (24 Aug 2019 05:18) (100% - 100%)    PHYSICAL EXAM:  GENERAL: NAD, well-developed  HEAD:  Atraumatic, Normocephalic  EYES: PERRLA, conjunctiva and sclera clear  NECK: Supple, No JVD. palpable nontender submandibular nodes  CHEST/LUNG: Clear to auscultation bilaterally; No wheeze  HEART: Regular rate and rhythm; No murmurs, rubs, or gallops  ABDOMEN: Soft, Nontender, Nondistended; Bowel sounds present  EXTREMITIES:  No clubbing, cyanosis, or edema  PSYCH: AAOx3  NEUROLOGY: non-focal  SKIN: rashes on left arm. 4-5, 1 cm demarcated non pustular, non palpable white rash.    CAPILLARY BLOOD GLUCOSE        I&O's Summary    23 Aug 2019 07:01  -  24 Aug 2019 07:00  --------------------------------------------------------  IN: 550 mL / OUT: 0 mL / NET: 550 mL              LABS:                        9.1    4.74  )-----------( 153      ( 24 Aug 2019 06:45 )             31.1     08-24    138  |  103  |  12  ----------------------------<  86  3.7   |  27  |  0.56    Ca    9.0      24 Aug 2019 06:45  Phos  3.9     08-24  Mg     1.8     08-24        CARDIAC MARKERS ( 22 Aug 2019 12:00 )  x     / x     / 63 u/L / 2.93 ng/mL / x                RADIOLOGY & ADDITIONAL TESTS: Cassidy Valdivia ([place]) PGY-1  Pager: [phone]     Patient is a 53y old  Female who presents with a chief complaint of Anemia and Sjogren's syndrome (23 Aug 2019 12:50)      SUBJECTIVE / OVERNIGHT EVENTS:  No acute complaints over night. Denies any fevers/chills, headache, CP, SOB, abd pain, N/V/D, constipation, or leg swelling.   denies episodes of palpitations, dizziness      MEDICATIONS  (STANDING):  hydroxychloroquine 200 milliGRAM(s) Oral daily    MEDICATIONS  (PRN):  artificial tears (preservative free) Ophthalmic Solution 1 Drop(s) Both EYES three times a day PRN Dry Eyes  cyclobenzaprine 5 milliGRAM(s) Oral three times a day PRN Muscle Spasm  ibuprofen  Tablet. 400 milliGRAM(s) Oral every 6 hours PRN Mild Pain (1 - 3), Moderate Pain (4 - 6)  oxyCODONE    IR 5 milliGRAM(s) Oral every 4 hours PRN Moderate Pain (4 - 6)  Saliva Substitute (CAPHOSOL) 30 milliLiter(s) Swish and Spit three times a day PRN Dry mouth          OBJECTIVE:  Vital Signs Last 24 Hrs  T(C): 36.8 (24 Aug 2019 05:18), Max: 37 (23 Aug 2019 21:25)  T(F): 98.3 (24 Aug 2019 05:18), Max: 98.6 (23 Aug 2019 21:25)  HR: 91 (24 Aug 2019 05:18) (90 - 93)  BP: 114/80 (24 Aug 2019 05:18) (114/80 - 135/72)  BP(mean): --  RR: 20 (24 Aug 2019 05:18) (18 - 20)  SpO2: 100% (24 Aug 2019 05:18) (100% - 100%)    PHYSICAL EXAM:  GENERAL: NAD, well-developed  HEAD:  Atraumatic, Normocephalic  EYES: PERRLA, conjunctiva and sclera clear  NECK: Supple, No JVD. palpable nontender submandibular nodes  CHEST/LUNG: Clear to auscultation bilaterally; No wheeze  HEART: Regular rate and rhythm; No murmurs, rubs, or gallops  ABDOMEN: Soft, Nontender, Nondistended; Bowel sounds present  EXTREMITIES:  No clubbing, cyanosis, or edema  PSYCH: AAOx3  NEUROLOGY: non-focal  SKIN: rashes on left arm. 4-5, 1 cm demarcated non pustular, non palpable white rash.    CAPILLARY BLOOD GLUCOSE        I&O's Summary    23 Aug 2019 07:01  -  24 Aug 2019 07:00  --------------------------------------------------------  IN: 550 mL / OUT: 0 mL / NET: 550 mL              LABS:                        9.1    4.74  )-----------( 153      ( 24 Aug 2019 06:45 )             31.1     08-24    138  |  103  |  12  ----------------------------<  86  3.7   |  27  |  0.56    Ca    9.0      24 Aug 2019 06:45  Phos  3.9     08-24  Mg     1.8     08-24        CARDIAC MARKERS ( 22 Aug 2019 12:00 )  x     / x     / 63 u/L / 2.93 ng/mL / x                RADIOLOGY & ADDITIONAL TESTS:    TTE reviewed:   1. Normal left ventricular internal dimensions and wall  thicknesses.  2. Normal left ventricular systolic function. No segmental  wall motion abnormalities.  3. Mild diastolic dysfunction (Stage I).  4. 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  5. Normal right ventricular size and function.  6. Normal tricuspid valve. Minimal tricuspid regurgitation.  7. Estimated pulmonary artery systolic pressure equals 33  mm Hg, assuming right atrial pressure equals 10  mm Hg,  consistent with normal pulmonary pressures.

## 2024-10-09 NOTE — CONSULT NOTE ADULT - CONSULT REASON
[facility]   Physical Therapy Treatment Note      Name: Alea Barrett  Clinic Number: [id number]    Therapy Diagnosis:   Encounter Diagnoses   Name Primary?    Posture imbalance Yes    Abdominal weakness     Impaired tolerance of activity      Physician: Estefania Benson NP    Visit Date: 10/9/2024    Physician Orders: PT Eval and Treat   Medical Diagnosis from Referral:   M54.2 (ICD-10-CM) - Cervicalgia   M54.16 (ICD-10-CM) - Lumbar radicular pain   Evaluation Date: 10/2/2024  Authorization Period Expiration: 12/31/2024  Plan of Care Expiration: 12/18/2024 or 12v post eval  Visit # (episodes)/ Visits authorized: 3 / eval +20 (POC 2 / 12 )  2nd FOTO visit 5  3rd FOTO visit 10  Progress Note Due: 11/2/2024  PTA: 0/5     Time In: 400  Time Out: 453  Total Billable Time: 53 minutes     Precautions: Anxiety; Depression  Insurance: Payor: [employer] / Plan: [employer] [place] HMO / Product Type: HMO /     Subjective     Pt reports: her neck is doing better. Her back is hurting today after work and she even got one of those shooting pains down her leg.    She was compliant with home exercise program.  Response to previous treatment: positive   Functional change: none reported    Pain: 2/10 neck; 6.5/10 back  Location: Occ ridge into skull and Upper Trapezius and midline; Lumbar into sacrum, radiates into glutes and thighs (>Left)     Objective      Psoas flexibility: Moderate restriction Bilateral     Treatment     Alea received the treatments listed below:      Alea received therapeutic exercises to develop strength, endurance, ROM, and flexibility for 10 minutes including:  NMRE utilized to activate appropriate mm to improve posture, core and lumbar stabilization and lumbopelvic stability: 43 minutes     TABLE:  Lumbar rotation, 10x Bilateral    TrA Ab brace, [id number]  TrA Ab brace with Bent Knee Fall Out, 2x10 Bilateral   Bridge, 2x10 Bilateral (hep)  Bilateral Sciatic Nerve  glide, 20x Bilateral (hep)  +Sidelying clamshells, 2x10 Bilateral (hep)  +Adam stretch, [id number] - had muscles spasms after relieved by rahul pose, 15 seconds next time or just do CAROL stretch     Prone quad stretch, [id number] Bilateral - NP    SEATED:  Seated Physioball: lumbar flexion / Alternate side to side, 10x Bilateral   Hamstring stretch, [id number] Bilateral   +Piriformis stretch, [id number] Bilateral (hep)  Bilateral Shoulder External Rotation, [id number] (hep)    NOT PERFORMED   Shoulder shrugs, 10x  T/s extension stretch, [id number]  Trap stretch, [id number]  Chin tucks, [id number]  C/S AROM: Flex/Ext / Rotation, 10x ea    Doorway stretch, W arms, [id number]  McK lumbar extension           Add NEXT: pt prone to spasms and high sensitivity/fear avoidance  Strengthen:Core / Biceps / Triceps / RTC / Mid trap  Focus posture  FUTURE:  Morning mobility routine    Ext / Rows / Prone T       Patient Education and Home Exercises       Education provided:   - daily HEP  - utilize heat in mornings; ice with flare-up; both 10-20 minute max / ice post tx if sore      Written Home Exercises Provided: Patient instructed to cont prior HEP. Exercises were reviewed and Alea was able to demonstrate them prior to the end of the session.  Alea demonstrated good  understanding of the education provided. See EMR under Patient Instructions for exercises provided during therapy sessions    Assessment     Pt demonstrates variable levels of pain, decreased cervical Range of Motion, core, trunk and hip weakness, and balance and gait deficits. Pt able to perform all therex given with minimal pn provocation. Moderate restriction found in psoas today. Attempted adam stretch, had spasms after relieved by rahul pose. Less anxiety in session today. Added hip strengthening and stretching to HEP. Continue to progress as tolerated.      Alea Is progressing well towards her goals.   Pt prognosis is Good.     Pt will continue to benefit from skilled  outpatient physical therapy to address the deficits listed in the problem list box on initial evaluation, provide pt/family education and to maximize pt's level of independence in the home and community environment.     Pt's spiritual, cultural and educational needs considered and pt agreeable to plan of care and goals.     Anticipated barriers to physical therapy: chronicity of condition    Goals:   Short Term GOALS: 3 weeks. Pt agrees with goals set. PROGRESS 10/9/2024  1. Patient demonstrates compliance with HEP.   2. Patient demonstrates independence with Postural Awareness while sitting and standing.   3. Patient demonstrates decreased pain level of 3/10 while performing daily activities.  4. Patient will improve Hamstring and pectoralis flexibility to mild restriction.     Long Term GOALS: 6 weeks. Pt agrees with goals set.  1. Patient demonstrates increased C/S AROM to improve tolerance to daily activities and turning head while driving.   2. Patient demonstrates increased core, hip and BLE strength by 1/2 grade or greater to improve tolerance to home chores.  3. Patient demonstrates increased periscapular muscle and Bilateral Upper Extremities strength by 1/2 grade or more to improve tolerance to work.  4. Patient demonstrates independence with body mechanics while working.  5. Patient demonstrates independence with HEP.   6. Patient will improve FOTO function score by 25% to show improvement in condition and functional mobility.     Plan     Continue PT as deemed per POC: spine mobility, core and trunk strengthening    Smiley Lima DPT     Lymph adenopathies

## 2024-12-19 NOTE — PATIENT PROFILE ADULT - OVER THE PAST TWO WEEKS, HAVE YOU FELT LITTLE INTEREST OR PLEASURE IN DOING THINGS?
[Time Spent: ___ minutes] : I have spent [unfilled] minutes of time on the encounter which excludes teaching and separately reported services. yes